# Patient Record
Sex: FEMALE | Race: WHITE | NOT HISPANIC OR LATINO | Employment: OTHER | URBAN - NONMETROPOLITAN AREA
[De-identification: names, ages, dates, MRNs, and addresses within clinical notes are randomized per-mention and may not be internally consistent; named-entity substitution may affect disease eponyms.]

---

## 2017-02-21 ENCOUNTER — OFFICE VISIT (OUTPATIENT)
Dept: PULMONOLOGY | Facility: CLINIC | Age: 57
End: 2017-02-21

## 2017-02-21 VITALS
DIASTOLIC BLOOD PRESSURE: 80 MMHG | WEIGHT: 120 LBS | SYSTOLIC BLOOD PRESSURE: 148 MMHG | BODY MASS INDEX: 22.08 KG/M2 | HEIGHT: 62 IN

## 2017-02-21 DIAGNOSIS — J44.9 CHRONIC OBSTRUCTIVE PULMONARY DISEASE, UNSPECIFIED COPD TYPE (HCC): Primary | ICD-10-CM

## 2017-02-21 DIAGNOSIS — Z72.0 TOBACCO USE: ICD-10-CM

## 2017-02-21 PROCEDURE — 99213 OFFICE O/P EST LOW 20 MIN: CPT | Performed by: INTERNAL MEDICINE

## 2017-02-21 RX ORDER — BUSPIRONE HYDROCHLORIDE 15 MG/1
15 TABLET ORAL
COMMUNITY
End: 2018-02-19

## 2017-02-21 NOTE — PROGRESS NOTES
This lady has COPD and persistent tobacco use.  She complains of mild wheezing and dyspnea however she is improved from the recent past.  She is not producing any purulent sputum    ROS    Constitutional-no night sweats weight loss headaches  GI no abdominal pain nausea or diarrhea  Neuro no seizure or neurologic deficits  Musculoskeletal no deformity or joint pain   no dysuria or hematuria  Skin no rash or other lesions  All other systems reviewed and were negative except for the above.      Physical Exam  Vital signs as above  Pupils equally round and reactive to light and accommodation, neck no JVD or adenopathy.  Cardiovascular regular rhythm and rate no murmur or gallop.  Abdomen soft no organomegaly tenderness.  Extremities no clubbing cyanosis or edema.  No cervical adenopathy.  No skin rash.  Neurologic good strength bilaterally without deficits mildly dyspneic white female, lungs are clear, O2 saturation 98%    Impression COPD with persistent dyspnea    Recommendations cessation of cigarette smoking, continue routine meds, return in 6 months

## 2017-06-16 ENCOUNTER — TELEPHONE (OUTPATIENT)
Dept: PULMONOLOGY | Facility: CLINIC | Age: 57
End: 2017-06-16

## 2017-06-16 NOTE — TELEPHONE ENCOUNTER
Prescirption refilled for 1 time on Spiriva inhaler.  Patient needs to make an appt for refills and recheck.

## 2017-06-16 NOTE — TELEPHONE ENCOUNTER
----- Message from Otilia Weston sent at 6/16/2017 10:57 AM CDT -----  Regarding: MED REFILL  Contact: [phone]  PATIENT IS WANTING A REFILL ON SYMBICORT AT [facility] [phone]

## 2017-08-22 ENCOUNTER — OFFICE VISIT (OUTPATIENT)
Dept: PULMONOLOGY | Facility: CLINIC | Age: 57
End: 2017-08-22

## 2017-08-22 VITALS
HEIGHT: 62 IN | BODY MASS INDEX: 22.16 KG/M2 | OXYGEN SATURATION: 92 % | DIASTOLIC BLOOD PRESSURE: 76 MMHG | SYSTOLIC BLOOD PRESSURE: 140 MMHG | WEIGHT: 120.4 LBS | HEART RATE: 51 BPM

## 2017-08-22 DIAGNOSIS — J43.1 PANLOBULAR EMPHYSEMA (HCC): Primary | ICD-10-CM

## 2017-08-22 PROCEDURE — 99213 OFFICE O/P EST LOW 20 MIN: CPT | Performed by: INTERNAL MEDICINE

## 2017-08-22 RX ORDER — DILTIAZEM HYDROCHLORIDE 60 MG/1
2 TABLET, FILM COATED ORAL 2 TIMES DAILY
Refills: 0 | COMMUNITY
Start: 2017-06-16 | End: 2018-12-27 | Stop reason: SDUPTHER

## 2017-08-22 RX ORDER — ALBUTEROL SULFATE 90 UG/1
AEROSOL, METERED RESPIRATORY (INHALATION)
Qty: 1 INHALER | Refills: 5 | Status: SHIPPED | OUTPATIENT
Start: 2017-08-22 | End: 2020-04-14 | Stop reason: SDUPTHER

## 2017-08-22 RX ORDER — HYDROXYZINE PAMOATE 50 MG/1
50 CAPSULE ORAL 2 TIMES DAILY
Refills: 3 | Status: ON HOLD | COMMUNITY
Start: 2017-07-03 | End: 2018-07-18

## 2017-08-22 RX ORDER — BUDESONIDE AND FORMOTEROL FUMARATE DIHYDRATE 160; 4.5 UG/1; UG/1
AEROSOL RESPIRATORY (INHALATION)
Qty: 1 INHALER | Refills: 5 | Status: SHIPPED | OUTPATIENT
Start: 2017-08-22 | End: 2020-02-10 | Stop reason: SDUPTHER

## 2017-08-22 NOTE — PROGRESS NOTES
"This lady has COPD and persistent tobacco use.  She continues to have cough wheeze shortness of breath and dyspnea on exertion.  She denies purulent sputum    ROS    Constitutional-no night sweats weight loss headaches  GI no abdominal pain nausea or diarrhea  Neuro no seizure or neurologic deficits  Musculoskeletal no deformity or joint pain   no dysuria or hematuria  Skin no rash or other lesions  All other systems reviewed and were negative except for the above.      Physical Exam  /76  Pulse 51  Ht 62\" (157.5 cm)  Wt 120 lb 6.4 oz (54.6 kg)  SpO2 92%  BMI 22.02 kg/m2  Vital signs as above  Pupils equally round and reactive to light and accommodation, neck no JVD or adenopathy.  Cardiovascular regular rhythm and rate no murmur or gallop.  Abdomen soft no organomegaly tenderness.  Extremities no clubbing cyanosis or edema.  No cervical adenopathy.  No skin rash.  Neurologic good strength bilaterally without deficits  Lungs reveal diminished breath sounds with rhonchi    Impression COPD and emphysema with persistent dyspnea, tobacco use    Recommendations cessation of cigarette smoking, continue present medications, return in 6 months  "

## 2017-12-01 ENCOUNTER — APPOINTMENT (OUTPATIENT)
Dept: GENERAL RADIOLOGY | Facility: HOSPITAL | Age: 57
End: 2017-12-01

## 2017-12-01 ENCOUNTER — HOSPITAL ENCOUNTER (EMERGENCY)
Facility: HOSPITAL | Age: 57
Discharge: HOME | End: 2017-12-02
Attending: EMERGENCY MEDICINE | Admitting: EMERGENCY MEDICINE

## 2017-12-01 DIAGNOSIS — J44.1 COPD EXACERBATION (HCC): ICD-10-CM

## 2017-12-01 DIAGNOSIS — J40 BRONCHITIS: Primary | ICD-10-CM

## 2017-12-01 LAB
ALBUMIN SERPL-MCNC: 3.6 G/DL (ref 3.4–4.8)
ALBUMIN/GLOB SERPL: 1.3 G/DL (ref 1.1–1.8)
ALP SERPL-CCNC: 67 U/L (ref 38–126)
ALT SERPL W P-5'-P-CCNC: 25 U/L (ref 9–52)
ANION GAP SERPL CALCULATED.3IONS-SCNC: 6 MMOL/L (ref 5–15)
AST SERPL-CCNC: 40 U/L (ref 14–36)
BASOPHILS # BLD AUTO: 0.01 10*3/MM3 (ref 0–0.2)
BASOPHILS NFR BLD AUTO: 0.1 % (ref 0–2)
BILIRUB SERPL-MCNC: 0.4 MG/DL (ref 0.2–1.3)
BUN BLD-MCNC: 10 MG/DL (ref 7–21)
BUN/CREAT SERPL: 13.3 (ref 7–25)
CALCIUM SPEC-SCNC: 9.1 MG/DL (ref 8.4–10.2)
CHLORIDE SERPL-SCNC: 104 MMOL/L (ref 95–110)
CO2 SERPL-SCNC: 28 MMOL/L (ref 22–31)
CREAT BLD-MCNC: 0.75 MG/DL (ref 0.5–1)
DEPRECATED RDW RBC AUTO: 46.1 FL (ref 36.4–46.3)
EOSINOPHIL # BLD AUTO: 0.03 10*3/MM3 (ref 0–0.7)
EOSINOPHIL NFR BLD AUTO: 0.3 % (ref 0–7)
ERYTHROCYTE [DISTWIDTH] IN BLOOD BY AUTOMATED COUNT: 13.9 % (ref 11.5–14.5)
GFR SERPL CREATININE-BSD FRML MDRD: 79 ML/MIN/1.73 (ref 60–120)
GLOBULIN UR ELPH-MCNC: 2.8 GM/DL (ref 2.3–3.5)
GLUCOSE BLD-MCNC: 110 MG/DL (ref 60–100)
HCT VFR BLD AUTO: 37.7 % (ref 35–45)
HGB BLD-MCNC: 12.7 G/DL (ref 12–15.5)
IMM GRANULOCYTES # BLD: 0.03 10*3/MM3 (ref 0–0.02)
IMM GRANULOCYTES NFR BLD: 0.3 % (ref 0–0.5)
INR PPP: 1.06 (ref 0.8–1.2)
LYMPHOCYTES # BLD AUTO: 2.6 10*3/MM3 (ref 0.6–4.2)
LYMPHOCYTES NFR BLD AUTO: 24.3 % (ref 10–50)
MCH RBC QN AUTO: 30.7 PG (ref 26.5–34)
MCHC RBC AUTO-ENTMCNC: 33.7 G/DL (ref 31.4–36)
MCV RBC AUTO: 91.1 FL (ref 80–98)
MONOCYTES # BLD AUTO: 1.33 10*3/MM3 (ref 0–0.9)
MONOCYTES NFR BLD AUTO: 12.5 % (ref 0–12)
NEUTROPHILS # BLD AUTO: 6.68 10*3/MM3 (ref 2–8.6)
NEUTROPHILS NFR BLD AUTO: 62.5 % (ref 37–80)
NT-PROBNP SERPL-MCNC: 279 PG/ML (ref 0–900)
PLATELET # BLD AUTO: 192 10*3/MM3 (ref 150–450)
PMV BLD AUTO: 9.8 FL (ref 8–12)
POTASSIUM BLD-SCNC: 3.4 MMOL/L (ref 3.5–5.1)
PROT SERPL-MCNC: 6.4 G/DL (ref 6.3–8.6)
PROTHROMBIN TIME: 13.7 SECONDS (ref 11.1–15.3)
RBC # BLD AUTO: 4.14 10*6/MM3 (ref 3.77–5.16)
SODIUM BLD-SCNC: 138 MMOL/L (ref 137–145)
TROPONIN I SERPL-MCNC: <0.012 NG/ML
WBC NRBC COR # BLD: 10.68 10*3/MM3 (ref 3.2–9.8)

## 2017-12-01 PROCEDURE — 71020 HC CHEST PA AND LATERAL: CPT

## 2017-12-01 PROCEDURE — 96375 TX/PRO/DX INJ NEW DRUG ADDON: CPT

## 2017-12-01 PROCEDURE — 99284 EMERGENCY DEPT VISIT MOD MDM: CPT

## 2017-12-01 PROCEDURE — 93010 ELECTROCARDIOGRAM REPORT: CPT | Performed by: INTERNAL MEDICINE

## 2017-12-01 PROCEDURE — 85025 COMPLETE CBC W/AUTO DIFF WBC: CPT | Performed by: EMERGENCY MEDICINE

## 2017-12-01 PROCEDURE — 83880 ASSAY OF NATRIURETIC PEPTIDE: CPT | Performed by: EMERGENCY MEDICINE

## 2017-12-01 PROCEDURE — 84484 ASSAY OF TROPONIN QUANT: CPT | Performed by: EMERGENCY MEDICINE

## 2017-12-01 PROCEDURE — 80053 COMPREHEN METABOLIC PANEL: CPT | Performed by: EMERGENCY MEDICINE

## 2017-12-01 PROCEDURE — 85610 PROTHROMBIN TIME: CPT | Performed by: EMERGENCY MEDICINE

## 2017-12-01 PROCEDURE — 96365 THER/PROPH/DIAG IV INF INIT: CPT

## 2017-12-01 PROCEDURE — 93005 ELECTROCARDIOGRAM TRACING: CPT | Performed by: EMERGENCY MEDICINE

## 2017-12-01 RX ORDER — SODIUM CHLORIDE 0.9 % (FLUSH) 0.9 %
10 SYRINGE (ML) INJECTION AS NEEDED
Status: DISCONTINUED | OUTPATIENT
Start: 2017-12-01 | End: 2017-12-02 | Stop reason: HOSPADM

## 2017-12-02 VITALS
DIASTOLIC BLOOD PRESSURE: 77 MMHG | BODY MASS INDEX: 22.08 KG/M2 | WEIGHT: 120 LBS | OXYGEN SATURATION: 94 % | TEMPERATURE: 98.7 F | HEIGHT: 62 IN | HEART RATE: 80 BPM | SYSTOLIC BLOOD PRESSURE: 164 MMHG | RESPIRATION RATE: 18 BRPM

## 2017-12-02 LAB
BILIRUB UR QL STRIP: NEGATIVE
CLARITY UR: CLEAR
COLOR UR: YELLOW
GLUCOSE UR STRIP-MCNC: NEGATIVE MG/DL
HGB UR QL STRIP.AUTO: NEGATIVE
HOLD SPECIMEN: NORMAL
HOLD SPECIMEN: NORMAL
KETONES UR QL STRIP: NEGATIVE
LEUKOCYTE ESTERASE UR QL STRIP.AUTO: NEGATIVE
NITRITE UR QL STRIP: NEGATIVE
PH UR STRIP.AUTO: 6 [PH] (ref 5–9)
PROT UR QL STRIP: NEGATIVE
SP GR UR STRIP: 1.02 (ref 1–1.03)
UROBILINOGEN UR QL STRIP: NORMAL
WHOLE BLOOD HOLD SPECIMEN: NORMAL
WHOLE BLOOD HOLD SPECIMEN: NORMAL

## 2017-12-02 PROCEDURE — 25010000002 CEFTRIAXONE: Performed by: EMERGENCY MEDICINE

## 2017-12-02 PROCEDURE — 25010000002 METHYLPREDNISOLONE PER 125 MG: Performed by: EMERGENCY MEDICINE

## 2017-12-02 PROCEDURE — 96375 TX/PRO/DX INJ NEW DRUG ADDON: CPT

## 2017-12-02 PROCEDURE — 96365 THER/PROPH/DIAG IV INF INIT: CPT

## 2017-12-02 PROCEDURE — 81003 URINALYSIS AUTO W/O SCOPE: CPT | Performed by: EMERGENCY MEDICINE

## 2017-12-02 RX ORDER — BENZONATATE 100 MG/1
100 CAPSULE ORAL 3 TIMES DAILY PRN
Qty: 20 CAPSULE | Refills: 0 | Status: SHIPPED | OUTPATIENT
Start: 2017-12-02 | End: 2018-02-19

## 2017-12-02 RX ORDER — PREDNISONE 50 MG/1
50 TABLET ORAL DAILY
Qty: 5 TABLET | Refills: 0 | Status: SHIPPED | OUTPATIENT
Start: 2017-12-02 | End: 2018-02-19

## 2017-12-02 RX ORDER — HYDROCODONE BITARTRATE AND ACETAMINOPHEN 5; 325 MG/1; MG/1
1 TABLET ORAL EVERY 6 HOURS PRN
Qty: 15 TABLET | Refills: 0 | Status: SHIPPED | OUTPATIENT
Start: 2017-12-02 | End: 2018-02-19

## 2017-12-02 RX ORDER — AZITHROMYCIN 250 MG/1
250 TABLET, FILM COATED ORAL DAILY
Qty: 6 TABLET | Refills: 0 | Status: SHIPPED | OUTPATIENT
Start: 2017-12-02 | End: 2018-02-19

## 2017-12-02 RX ORDER — METHYLPREDNISOLONE SODIUM SUCCINATE 125 MG/2ML
125 INJECTION, POWDER, LYOPHILIZED, FOR SOLUTION INTRAMUSCULAR; INTRAVENOUS ONCE
Status: COMPLETED | OUTPATIENT
Start: 2017-12-02 | End: 2017-12-02

## 2017-12-02 RX ORDER — HYDROCODONE BITARTRATE AND ACETAMINOPHEN 5; 325 MG/1; MG/1
1 TABLET ORAL ONCE
Status: COMPLETED | OUTPATIENT
Start: 2017-12-02 | End: 2017-12-02

## 2017-12-02 RX ADMIN — METHYLPREDNISOLONE SODIUM SUCCINATE 125 MG: 125 INJECTION, POWDER, FOR SOLUTION INTRAMUSCULAR; INTRAVENOUS at 01:33

## 2017-12-02 RX ADMIN — CEFTRIAXONE 1 G: 1 INJECTION, POWDER, FOR SOLUTION INTRAMUSCULAR; INTRAVENOUS at 00:59

## 2017-12-02 RX ADMIN — HYDROCODONE BITARTRATE AND ACETAMINOPHEN 1 TABLET: 5; 325 TABLET ORAL at 01:33

## 2017-12-02 NOTE — DISCHARGE INSTRUCTIONS
Follow-up with her doctor in about 3-5 days if symptoms persist for further evaluation and management.  Medications as prescribed.  Return with any new or worsening symptoms or any concerns.

## 2017-12-02 NOTE — ED PROVIDER NOTES
Subjective   HPI Comments: Patient presents with cough and increased sputum production and subjective fevers.  Patient also notes pain with cough in her ribs and back, as well as her abdominal wall.  This has known COPD.  Patient is on treatments at home including inhaler and nebulized treatments.  Patient sees Dr. Sang Marcos for her lungs.  Patient's primary care physician is Dr. Pam Campos.  Patient states she's been taking Robitussin Tylenol and Mucinex and the symptoms have been worsening patient concerned with possible flu with some exposures that she's had.  No history of atherosclerotic cardiac disease.  Patient is a nontobacco user.      History provided by:  Patient      Review of Systems   Constitutional: Negative.  Negative for appetite change, chills and fever.   HENT: Negative.  Negative for congestion.    Eyes: Negative.  Negative for photophobia and visual disturbance.   Respiratory: Positive for cough and shortness of breath. Negative for chest tightness.    Cardiovascular: Negative.  Negative for chest pain and palpitations.   Gastrointestinal: Negative.  Negative for abdominal pain, constipation, diarrhea, nausea and vomiting.   Endocrine: Negative.    Genitourinary: Negative.  Negative for decreased urine volume, dysuria, flank pain and hematuria.   Musculoskeletal: Negative.  Negative for arthralgias, back pain, myalgias, neck pain and neck stiffness.   Skin: Negative.  Negative for pallor.   Neurological: Negative.  Negative for dizziness, syncope, weakness, light-headedness, numbness and headaches.   Psychiatric/Behavioral: Negative.  Negative for confusion and suicidal ideas. The patient is not nervous/anxious.    All other systems reviewed and are negative.      Past Medical History:   Diagnosis Date   • COPD (chronic obstructive pulmonary disease)    • Emphysema of lung    • SOB (shortness of breath)    • Tobacco use    • Vaginal bleeding        Allergies   Allergen Reactions   •  Amitriptyline    • Codeine    • Tricyclic Antidepressants    • Tylenol [Acetaminophen]        Past Surgical History:   Procedure Laterality Date   • HYSTERECTOMY      Ovary Preserv, age 16       Family History   Problem Relation Age of Onset   • Family history unknown: Yes       Social History     Social History   • Marital status: Single     Spouse name: N/A   • Number of children: N/A   • Years of education: N/A     Social History Main Topics   • Smoking status: Current Every Day Smoker   • Smokeless tobacco: Current User   • Alcohol use No   • Drug use: No   • Sexual activity: Not Asked     Other Topics Concern   • None     Social History Narrative           Objective   Physical Exam   Constitutional: She is oriented to person, place, and time. She appears well-developed and well-nourished. No distress.   HENT:   Head: Normocephalic and atraumatic.   Nose: Nose normal.   Mouth/Throat: Oropharynx is clear and moist.   Eyes: Conjunctivae and EOM are normal. No scleral icterus.   Neck: Normal range of motion. Neck supple. No JVD present.   Cardiovascular: Normal rate, regular rhythm, normal heart sounds and intact distal pulses.  Exam reveals no gallop and no friction rub.    No murmur heard.  Pulmonary/Chest: Effort normal. No respiratory distress. She has no wheezes. She has no rales. She exhibits no tenderness.   Patient is moving good air no increased respiratory effort.  Patient is coughing often with increased sputum production.  Sputum currently is yellowish in color.   Abdominal: Soft. She exhibits no distension and no mass. There is no tenderness. There is no rebound and no guarding.   Musculoskeletal: Normal range of motion. She exhibits no edema, tenderness or deformity.   Lymphadenopathy:     She has no cervical adenopathy.   Neurological: She is alert and oriented to person, place, and time. No cranial nerve deficit. She exhibits normal muscle tone.   Skin: Skin is warm and dry. No rash noted. She is  not diaphoretic. No erythema. No pallor.   Psychiatric: She has a normal mood and affect. Her behavior is normal. Judgment and thought content normal.   Nursing note and vitals reviewed.      ECG 12 Lead    Date/Time: 12/1/2017 10:59 PM  Performed by: EMILY CASSIDY  Authorized by: EMILY CASSIDY   Rhythm: sinus rhythm and paced  Clinical impression: non-specific ECG               ED Course  ED Course      Labs Reviewed   COMPREHENSIVE METABOLIC PANEL - Abnormal; Notable for the following:        Result Value    Glucose 110 (*)     Potassium 3.4 (*)     AST (SGOT) 40 (*)     All other components within normal limits   CBC WITH AUTO DIFFERENTIAL - Abnormal; Notable for the following:     WBC 10.68 (*)     Monocyte % 12.5 (*)     Monocytes, Absolute 1.33 (*)     Immature Grans, Absolute 0.03 (*)     All other components within normal limits   TROPONIN (IN-HOUSE) - Normal   BNP (IN-HOUSE) - Normal   PROTIME-INR - Normal    Narrative:     Therapeutic range for most indications is 2.0-3.0 INR,  or 2.5-3.5 for mechanical heart valves.   URINALYSIS W/ CULTURE IF INDICATED - Normal    Narrative:     Urine microscopic not indicated.   RAINBOW DRAW    Narrative:     The following orders were created for panel order [place] Draw.  Procedure                               Abnormality         Status                     ---------                               -----------         ------                     Light Blue Top[[id number]]                                   Final result               Green Top (Gel)[[id number]]                                  Final result               Lavender Top[[id number]]                                     Final result               Gold Top - SST[[id number]]                                   Final result                 Please view results for these tests on the individual orders.   TROPONIN (IN-HOUSE)   CBC AND DIFFERENTIAL    Narrative:     The following orders were created for panel order CBC &  Differential.  Procedure                               Abnormality         Status                     ---------                               -----------         ------                     CBC Auto Differential[[id number]]        Abnormal            Final result                 Please view results for these tests on the individual orders.   LIGHT BLUE TOP   GREEN TOP   LAVENDER TOP   GOLD TOP - SST       XR Chest 2 View   Final Result   No acute cardiopulmonary abnormality.      Electronically signed by:  Will Swain MD  12/1/2017 11:21 PM   [facility] Workstation: [id number]        Patient with no significant leukocytosis or x-ray findings.  Signs and symptoms consistent with bronchitis/COPD exacerbation.  Patient started on steroids antibiotics and cough suppressant.  Patient follow-up with her outpatient provider for further management.  Patient sees to saturate well in the mid 90s off of oxygen.  No increased work of breathing.  Though some discomfort with cough.            [facility]    Final diagnoses:   Bronchitis   COPD exacerbation            Manuel Seymour MD  12/02/17 0143

## 2017-12-06 DIAGNOSIS — R06.02 SHORTNESS OF BREATH: ICD-10-CM

## 2017-12-06 RX ORDER — IPRATROPIUM BROMIDE AND ALBUTEROL SULFATE 2.5; .5 MG/3ML; MG/3ML
SOLUTION RESPIRATORY (INHALATION)
Qty: 360 ML | Refills: 11 | Status: SHIPPED | OUTPATIENT
Start: 2017-12-06 | End: 2019-01-30 | Stop reason: SDUPTHER

## 2018-02-01 ENCOUNTER — OFFICE VISIT (OUTPATIENT)
Dept: PULMONOLOGY | Facility: CLINIC | Age: 58
End: 2018-02-01

## 2018-02-01 VITALS
WEIGHT: 120 LBS | DIASTOLIC BLOOD PRESSURE: 74 MMHG | HEIGHT: 62 IN | BODY MASS INDEX: 22.08 KG/M2 | HEART RATE: 63 BPM | SYSTOLIC BLOOD PRESSURE: 132 MMHG | OXYGEN SATURATION: 84 %

## 2018-02-01 DIAGNOSIS — J41.8 MIXED SIMPLE AND MUCOPURULENT CHRONIC BRONCHITIS (HCC): ICD-10-CM

## 2018-02-01 DIAGNOSIS — J43.1 PANLOBULAR EMPHYSEMA (HCC): Primary | ICD-10-CM

## 2018-02-01 PROCEDURE — 99213 OFFICE O/P EST LOW 20 MIN: CPT | Performed by: INTERNAL MEDICINE

## 2018-02-01 RX ORDER — RAMIPRIL 1.25 MG/1
1.25 CAPSULE ORAL
COMMUNITY
Start: 2018-01-11 | End: 2018-07-20 | Stop reason: HOSPADM

## 2018-02-01 RX ORDER — ROSUVASTATIN CALCIUM 10 MG/1
10 TABLET, COATED ORAL NIGHTLY
Status: ON HOLD | COMMUNITY
End: 2018-07-17

## 2018-02-01 RX ORDER — CLOPIDOGREL BISULFATE 75 MG/1
75 TABLET ORAL DAILY
COMMUNITY
Start: 2018-01-11

## 2018-02-01 NOTE — PROGRESS NOTES
"This 57-year-old lady with emphysema recently quit smoking cigarettes but continues to have shortness of breath and dyspnea on minimal exertion despite taking bronchodilators.  She has not coughing or producing purulent sputum.    ROS    Constitutional-no night sweats weight loss headaches  GI no abdominal pain nausea or diarrhea  Neuro no seizure or neurologic deficits  Musculoskeletal no deformity or joint pain   no dysuria or hematuria  Skin no rash or other lesions  All other systems reviewed and were negative except for the above.      Physical Exam  /74 (BP Location: Left arm, Patient Position: Sitting, Cuff Size: Adult)  Pulse 63  Ht 157.5 cm (62\")  Wt 54.4 kg (120 lb)  SpO2 (!) 84%  BMI 21.95 kg/m2  Vital signs as above  Pupils equally round and reactive to light and accommodation, neck no JVD or adenopathy.  Cardiovascular regular rhythm and rate no murmur or gallop.  Abdomen soft no organomegaly tenderness.  Extremities no clubbing cyanosis or edema.  No cervical adenopathy.  No skin rash.  Neurologic good strength bilaterally without deficits  Lungs reveal diminished breath sounds with prolonged expiration without wheeze    Last chest x-ray showed emphysema, O2 sat 94 at rest on room air however minimal walking on room air O2 saturation dropped to 84%    Impression edema with hypoxemia    Recommendations O2 2 L nasal the a concentrator, continue present medications, return in 3 months          This document has been electronically signed by Sang Marcos MD on February 1, 2018 1:53 PM      "

## 2018-02-05 ENCOUNTER — TELEPHONE (OUTPATIENT)
Dept: PULMONOLOGY | Facility: CLINIC | Age: 58
End: 2018-02-05

## 2018-02-19 ENCOUNTER — HOSPITAL ENCOUNTER (OUTPATIENT)
Facility: HOSPITAL | Age: 58
Setting detail: OBSERVATION
Discharge: HOME | End: 2018-02-20
Attending: EMERGENCY MEDICINE | Admitting: INTERNAL MEDICINE

## 2018-02-19 ENCOUNTER — APPOINTMENT (OUTPATIENT)
Dept: CT IMAGING | Facility: HOSPITAL | Age: 58
End: 2018-02-19

## 2018-02-19 ENCOUNTER — APPOINTMENT (OUTPATIENT)
Dept: GENERAL RADIOLOGY | Facility: HOSPITAL | Age: 58
End: 2018-02-19

## 2018-02-19 DIAGNOSIS — R06.00 DYSPNEA, UNSPECIFIED TYPE: ICD-10-CM

## 2018-02-19 DIAGNOSIS — R07.9 CHEST PAIN, UNSPECIFIED TYPE: Primary | ICD-10-CM

## 2018-02-19 DIAGNOSIS — J44.9 COPD WITH HYPOXIA (HCC): ICD-10-CM

## 2018-02-19 DIAGNOSIS — R09.02 COPD WITH HYPOXIA (HCC): ICD-10-CM

## 2018-02-19 LAB
ALBUMIN SERPL-MCNC: 3.9 G/DL (ref 3.4–4.8)
ALBUMIN/GLOB SERPL: 1.3 G/DL (ref 1.1–1.8)
ALP SERPL-CCNC: 66 U/L (ref 38–126)
ALT SERPL W P-5'-P-CCNC: 24 U/L (ref 9–52)
ANION GAP SERPL CALCULATED.3IONS-SCNC: 10 MMOL/L (ref 5–15)
AST SERPL-CCNC: 31 U/L (ref 14–36)
BASOPHILS # BLD AUTO: 0.01 10*3/MM3 (ref 0–0.2)
BASOPHILS NFR BLD AUTO: 0.1 % (ref 0–2)
BILIRUB SERPL-MCNC: 0.5 MG/DL (ref 0.2–1.3)
BILIRUB UR QL STRIP: NEGATIVE
BUN BLD-MCNC: 19 MG/DL (ref 7–21)
BUN/CREAT SERPL: 25.7 (ref 7–25)
CALCIUM SPEC-SCNC: 9.7 MG/DL (ref 8.4–10.2)
CHLORIDE SERPL-SCNC: 101 MMOL/L (ref 95–110)
CLARITY UR: CLEAR
CO2 SERPL-SCNC: 33 MMOL/L (ref 22–31)
COLOR UR: YELLOW
CREAT BLD-MCNC: 0.74 MG/DL (ref 0.5–1)
D-DIMER, QUANTITATIVE (MAD,POW, STR): 833 NG/ML (FEU) (ref 0–470)
DEPRECATED RDW RBC AUTO: 49 FL (ref 36.4–46.3)
EOSINOPHIL # BLD AUTO: 0.1 10*3/MM3 (ref 0–0.7)
EOSINOPHIL NFR BLD AUTO: 1.3 % (ref 0–7)
ERYTHROCYTE [DISTWIDTH] IN BLOOD BY AUTOMATED COUNT: 14.2 % (ref 11.5–14.5)
GFR SERPL CREATININE-BSD FRML MDRD: 80 ML/MIN/1.73 (ref 60–120)
GLOBULIN UR ELPH-MCNC: 3.1 GM/DL (ref 2.3–3.5)
GLUCOSE BLD-MCNC: 89 MG/DL (ref 60–100)
GLUCOSE UR STRIP-MCNC: NEGATIVE MG/DL
HCT VFR BLD AUTO: 36.9 % (ref 35–45)
HGB BLD-MCNC: 12.1 G/DL (ref 12–15.5)
HGB UR QL STRIP.AUTO: NEGATIVE
HOLD SPECIMEN: NORMAL
HOLD SPECIMEN: NORMAL
IMM GRANULOCYTES # BLD: 0.01 10*3/MM3 (ref 0–0.02)
IMM GRANULOCYTES NFR BLD: 0.1 % (ref 0–0.5)
INR PPP: 0.95 (ref 0.8–1.2)
KETONES UR QL STRIP: NEGATIVE
LEUKOCYTE ESTERASE UR QL STRIP.AUTO: NEGATIVE
LIPASE SERPL-CCNC: 106 U/L (ref 23–300)
LYMPHOCYTES # BLD AUTO: 2.02 10*3/MM3 (ref 0.6–4.2)
LYMPHOCYTES NFR BLD AUTO: 27 % (ref 10–50)
MCH RBC QN AUTO: 30.9 PG (ref 26.5–34)
MCHC RBC AUTO-ENTMCNC: 32.8 G/DL (ref 31.4–36)
MCV RBC AUTO: 94.1 FL (ref 80–98)
MONOCYTES # BLD AUTO: 0.78 10*3/MM3 (ref 0–0.9)
MONOCYTES NFR BLD AUTO: 10.4 % (ref 0–12)
NEUTROPHILS # BLD AUTO: 4.57 10*3/MM3 (ref 2–8.6)
NEUTROPHILS NFR BLD AUTO: 61.1 % (ref 37–80)
NITRITE UR QL STRIP: NEGATIVE
NT-PROBNP SERPL-MCNC: 967 PG/ML (ref 0–900)
PH UR STRIP.AUTO: 5.5 [PH] (ref 5–9)
PLATELET # BLD AUTO: 226 10*3/MM3 (ref 150–450)
PMV BLD AUTO: 10.2 FL (ref 8–12)
POTASSIUM BLD-SCNC: 3.9 MMOL/L (ref 3.5–5.1)
PROT SERPL-MCNC: 7 G/DL (ref 6.3–8.6)
PROT UR QL STRIP: NEGATIVE
PROTHROMBIN TIME: 12.6 SECONDS (ref 11.1–15.3)
RBC # BLD AUTO: 3.92 10*6/MM3 (ref 3.77–5.16)
SODIUM BLD-SCNC: 144 MMOL/L (ref 137–145)
SP GR UR STRIP: 1.02 (ref 1–1.03)
TROPONIN I SERPL-MCNC: <0.012 NG/ML
UROBILINOGEN UR QL STRIP: NORMAL
WBC NRBC COR # BLD: 7.49 10*3/MM3 (ref 3.2–9.8)
WHOLE BLOOD HOLD SPECIMEN: NORMAL

## 2018-02-19 PROCEDURE — 93010 ELECTROCARDIOGRAM REPORT: CPT | Performed by: INTERNAL MEDICINE

## 2018-02-19 PROCEDURE — G0378 HOSPITAL OBSERVATION PER HR: HCPCS

## 2018-02-19 PROCEDURE — 85610 PROTHROMBIN TIME: CPT | Performed by: EMERGENCY MEDICINE

## 2018-02-19 PROCEDURE — 71275 CT ANGIOGRAPHY CHEST: CPT

## 2018-02-19 PROCEDURE — 99285 EMERGENCY DEPT VISIT HI MDM: CPT

## 2018-02-19 PROCEDURE — 80053 COMPREHEN METABOLIC PANEL: CPT | Performed by: EMERGENCY MEDICINE

## 2018-02-19 PROCEDURE — 94640 AIRWAY INHALATION TREATMENT: CPT

## 2018-02-19 PROCEDURE — 0 IOPAMIDOL PER 1 ML: Performed by: INTERNAL MEDICINE

## 2018-02-19 PROCEDURE — 93005 ELECTROCARDIOGRAM TRACING: CPT | Performed by: EMERGENCY MEDICINE

## 2018-02-19 PROCEDURE — 81003 URINALYSIS AUTO W/O SCOPE: CPT | Performed by: EMERGENCY MEDICINE

## 2018-02-19 PROCEDURE — 84484 ASSAY OF TROPONIN QUANT: CPT | Performed by: EMERGENCY MEDICINE

## 2018-02-19 PROCEDURE — 25010000002 METHYLPREDNISOLONE PER 125 MG: Performed by: NURSE PRACTITIONER

## 2018-02-19 PROCEDURE — 96374 THER/PROPH/DIAG INJ IV PUSH: CPT

## 2018-02-19 PROCEDURE — 85379 FIBRIN DEGRADATION QUANT: CPT | Performed by: NURSE PRACTITIONER

## 2018-02-19 PROCEDURE — 84484 ASSAY OF TROPONIN QUANT: CPT | Performed by: NURSE PRACTITIONER

## 2018-02-19 PROCEDURE — 83690 ASSAY OF LIPASE: CPT | Performed by: EMERGENCY MEDICINE

## 2018-02-19 PROCEDURE — 85025 COMPLETE CBC W/AUTO DIFF WBC: CPT | Performed by: EMERGENCY MEDICINE

## 2018-02-19 PROCEDURE — 71045 X-RAY EXAM CHEST 1 VIEW: CPT

## 2018-02-19 PROCEDURE — 83880 ASSAY OF NATRIURETIC PEPTIDE: CPT | Performed by: EMERGENCY MEDICINE

## 2018-02-19 RX ORDER — RAMIPRIL 1.25 MG/1
1.25 CAPSULE ORAL
Status: DISCONTINUED | OUTPATIENT
Start: 2018-02-19 | End: 2018-02-20 | Stop reason: HOSPADM

## 2018-02-19 RX ORDER — ATENOLOL 25 MG/1
25 TABLET ORAL DAILY
Status: DISCONTINUED | OUTPATIENT
Start: 2018-02-20 | End: 2018-02-20 | Stop reason: HOSPADM

## 2018-02-19 RX ORDER — PANTOPRAZOLE SODIUM 40 MG/1
40 TABLET, DELAYED RELEASE ORAL
Status: DISCONTINUED | OUTPATIENT
Start: 2018-02-20 | End: 2018-02-20 | Stop reason: HOSPADM

## 2018-02-19 RX ORDER — ACETAMINOPHEN 500 MG
1000 TABLET ORAL ONCE
Status: COMPLETED | OUTPATIENT
Start: 2018-02-19 | End: 2018-02-19

## 2018-02-19 RX ORDER — ATENOLOL 25 MG/1
25 TABLET ORAL DAILY
COMMUNITY

## 2018-02-19 RX ORDER — ROSUVASTATIN CALCIUM 10 MG/1
10 TABLET, COATED ORAL NIGHTLY
Status: DISCONTINUED | OUTPATIENT
Start: 2018-02-19 | End: 2018-02-20 | Stop reason: HOSPADM

## 2018-02-19 RX ORDER — HYDROXYZINE PAMOATE 25 MG/1
50 CAPSULE ORAL 2 TIMES DAILY
Status: DISCONTINUED | OUTPATIENT
Start: 2018-02-19 | End: 2018-02-20 | Stop reason: HOSPADM

## 2018-02-19 RX ORDER — ONDANSETRON 2 MG/ML
4 INJECTION INTRAMUSCULAR; INTRAVENOUS EVERY 6 HOURS PRN
Status: DISCONTINUED | OUTPATIENT
Start: 2018-02-19 | End: 2018-02-20 | Stop reason: HOSPADM

## 2018-02-19 RX ORDER — ASPIRIN 81 MG/1
81 TABLET ORAL DAILY
COMMUNITY

## 2018-02-19 RX ORDER — SODIUM CHLORIDE 0.9 % (FLUSH) 0.9 %
10 SYRINGE (ML) INJECTION AS NEEDED
Status: DISCONTINUED | OUTPATIENT
Start: 2018-02-19 | End: 2018-02-20 | Stop reason: HOSPADM

## 2018-02-19 RX ORDER — ESCITALOPRAM OXALATE 10 MG/1
10 TABLET ORAL EVERY MORNING
Status: DISCONTINUED | OUTPATIENT
Start: 2018-02-20 | End: 2018-02-20 | Stop reason: HOSPADM

## 2018-02-19 RX ORDER — BUDESONIDE AND FORMOTEROL FUMARATE DIHYDRATE 80; 4.5 UG/1; UG/1
2 AEROSOL RESPIRATORY (INHALATION) 2 TIMES DAILY
Status: DISCONTINUED | OUTPATIENT
Start: 2018-02-19 | End: 2018-02-20 | Stop reason: HOSPADM

## 2018-02-19 RX ORDER — CLOPIDOGREL BISULFATE 75 MG/1
75 TABLET ORAL DAILY
Status: DISCONTINUED | OUTPATIENT
Start: 2018-02-19 | End: 2018-02-20 | Stop reason: HOSPADM

## 2018-02-19 RX ORDER — ESCITALOPRAM OXALATE 10 MG/1
10 TABLET ORAL EVERY MORNING
COMMUNITY

## 2018-02-19 RX ORDER — METHYLPREDNISOLONE SODIUM SUCCINATE 125 MG/2ML
60 INJECTION, POWDER, LYOPHILIZED, FOR SOLUTION INTRAMUSCULAR; INTRAVENOUS EVERY 6 HOURS
Status: DISCONTINUED | OUTPATIENT
Start: 2018-02-19 | End: 2018-02-20 | Stop reason: HOSPADM

## 2018-02-19 RX ORDER — BUDESONIDE AND FORMOTEROL FUMARATE DIHYDRATE 160; 4.5 UG/1; UG/1
2 AEROSOL RESPIRATORY (INHALATION)
Status: DISCONTINUED | OUTPATIENT
Start: 2018-02-19 | End: 2018-02-19 | Stop reason: SDUPTHER

## 2018-02-19 RX ORDER — ASPIRIN 81 MG/1
81 TABLET ORAL DAILY
Status: DISCONTINUED | OUTPATIENT
Start: 2018-02-20 | End: 2018-02-20 | Stop reason: HOSPADM

## 2018-02-19 RX ORDER — ALBUTEROL SULFATE 90 UG/1
2 AEROSOL, METERED RESPIRATORY (INHALATION)
Status: DISCONTINUED | OUTPATIENT
Start: 2018-02-19 | End: 2018-02-19 | Stop reason: CLARIF

## 2018-02-19 RX ORDER — NITROGLYCERIN 0.4 MG/1
0.4 TABLET SUBLINGUAL
COMMUNITY

## 2018-02-19 RX ORDER — CYCLOSPORINE 0.5 MG/ML
1 EMULSION OPHTHALMIC 2 TIMES DAILY
COMMUNITY

## 2018-02-19 RX ORDER — ASPIRIN 325 MG
325 TABLET ORAL ONCE
Status: COMPLETED | OUTPATIENT
Start: 2018-02-19 | End: 2018-02-19

## 2018-02-19 RX ORDER — SODIUM CHLORIDE 0.9 % (FLUSH) 0.9 %
1-10 SYRINGE (ML) INJECTION AS NEEDED
Status: DISCONTINUED | OUTPATIENT
Start: 2018-02-19 | End: 2018-02-20 | Stop reason: HOSPADM

## 2018-02-19 RX ORDER — IPRATROPIUM BROMIDE AND ALBUTEROL SULFATE 2.5; .5 MG/3ML; MG/3ML
3 SOLUTION RESPIRATORY (INHALATION)
Status: DISCONTINUED | OUTPATIENT
Start: 2018-02-19 | End: 2018-02-20 | Stop reason: HOSPADM

## 2018-02-19 RX ORDER — MORPHINE SULFATE 2 MG/ML
1 INJECTION, SOLUTION INTRAMUSCULAR; INTRAVENOUS EVERY 4 HOURS PRN
Status: DISCONTINUED | OUTPATIENT
Start: 2018-02-19 | End: 2018-02-20 | Stop reason: HOSPADM

## 2018-02-19 RX ORDER — ACETAMINOPHEN 325 MG/1
650 TABLET ORAL EVERY 4 HOURS PRN
Status: DISCONTINUED | OUTPATIENT
Start: 2018-02-19 | End: 2018-02-20 | Stop reason: HOSPADM

## 2018-02-19 RX ADMIN — IPRATROPIUM BROMIDE AND ALBUTEROL SULFATE 3 ML: 2.5; .5 SOLUTION RESPIRATORY (INHALATION) at 19:28

## 2018-02-19 RX ADMIN — ROSUVASTATIN CALCIUM 10 MG: 10 TABLET, FILM COATED ORAL at 20:29

## 2018-02-19 RX ADMIN — HYDROXYZINE PAMOATE 50 MG: 25 CAPSULE ORAL at 20:29

## 2018-02-19 RX ADMIN — IOPAMIDOL 59 ML: 755 INJECTION, SOLUTION INTRAVENOUS at 19:55

## 2018-02-19 RX ADMIN — BUDESONIDE AND FORMOTEROL FUMARATE DIHYDRATE 2 PUFF: 80; 4.5 AEROSOL RESPIRATORY (INHALATION) at 19:28

## 2018-02-19 RX ADMIN — ACETAMINOPHEN 1000 MG: 500 TABLET ORAL at 15:19

## 2018-02-19 RX ADMIN — METHYLPREDNISOLONE SODIUM SUCCINATE 60 MG: 125 INJECTION, POWDER, FOR SOLUTION INTRAMUSCULAR; INTRAVENOUS at 17:48

## 2018-02-19 RX ADMIN — CLOPIDOGREL BISULFATE 75 MG: 75 TABLET ORAL at 17:48

## 2018-02-19 RX ADMIN — ASPIRIN 325 MG: 325 TABLET, COATED ORAL at 09:29

## 2018-02-19 RX ADMIN — RAMIPRIL 1.25 MG: 1.25 CAPSULE ORAL at 17:48

## 2018-02-19 NOTE — H&P
[facility] Medicine Admission      Date of Admission: 2/19/2018      Primary Care Physician: BERNARDA Quijano      Chief Complaint: Chest pain/ shortness of breath    HPI:  This is a 57 year old lady that presents to [facility] with complaints of chest pain and shortness of air.  The patient has a history of emphysema and follows with Dr. Sang Marcos.  Per his office note, the patient continues to have shortness of breath and dyspnea with minimal exertion despite taking bronchodilators.  The patient follows with cardiologist Dr. Carpio in [place].  The patient had a heart cath on 1/10/2018 that showed non-obstructive CAD and a normal left ventricular systolic function.       Concurrent Medical History:  has a past medical history of COPD (chronic obstructive pulmonary disease); Emphysema of lung; SOB (shortness of breath); Tobacco use; and Vaginal bleeding.    Past Surgical History:  has a past surgical history that includes Hysterectomy.    Family History: Family history is unknown by patient.    Social History:  reports that she has been smoking.  She uses smokeless tobacco. She reports that she does not drink alcohol or use illicit drugs.    Allergies:   Allergies   Allergen Reactions   • Amitriptyline    • Codeine    • Nortriptyline    • Tricyclic Antidepressants    • Tylenol [Acetaminophen] Nausea Only       Medications:   Prior to Admission medications    Medication Sig Start Date End Date Taking? Authorizing Provider   aspirin 81 MG EC tablet Take 81 mg by mouth Daily.   Yes Historical Provider, MD   atenolol (TENORMIN) 25 MG tablet Take 25 mg by mouth Daily.   Yes Historical Provider, MD   budesonide-formoterol (SYMBICORT) 160-4.5 MCG/ACT inhaler 2 puffs twice a day 8/22/17  Yes Sang Marcos MD   clopidogrel (PLAVIX) 75 MG tablet Take 75 mg by mouth Daily. 1/11/18  Yes Historical Provider, MD   escitalopram (LEXAPRO) 10 MG tablet Take 10 mg by mouth Every  Morning.   Yes Historical Provider, MD   hydrOXYzine (VISTARIL) 50 MG capsule Take 50 mg by mouth 2 (Two) Times a Day. 7/3/17  Yes Historical Provider, MD   ipratropium-albuterol (DUO-NEB) 0.5-2.5 mg/mL nebulizer USE 1 VIAL IN NEBULIZER FOUR TIMES DAILY 12/6/17  Yes Sang Marcos MD   Omega-3 Fatty Acids (OMEGA 3 PO) Take  by mouth.   Yes Historical Provider, MD   OMEPRAZOLE PO Take 20 mg by mouth Daily As Needed (stomach acid).   Yes Historical Provider, MD   Pediatric Multiple Vitamins (THERA MULTI-VITAMIN PO) Take 1 tablet by mouth Daily.   Yes Historical Provider, MD   ramipril (ALTACE) 1.25 MG capsule Take 1.25 mg by mouth. 1/11/18  Yes Historical Provider, MD   rosuvastatin (CRESTOR) 10 MG tablet Take 10 mg by mouth Every Night.   Yes Historical Provider, MD   SYMBICORT 80-4.5 MCG/ACT inhaler Inhale 2 puffs 2 (Two) Times a Day. 6/16/17  Yes Historical Provider, MD   ATENOLOL PO Take 25 mg by mouth Daily.  2/19/18 Yes Historical Provider, MD   albuterol (VENTOLIN HFA) 108 (90 Base) MCG/ACT inhaler 2 puffs every 4 hours as needed for breathing 8/22/17   Sang Marcos MD   cycloSPORINE (RESTASIS) 0.05 % ophthalmic emulsion Administer 1 drop to both eyes 2 (Two) Times a Day.    Historical Provider, MD   nitroglycerin (NITROSTAT) 0.4 MG SL tablet Place 0.4 mg under the tongue Every 5 (Five) Minutes As Needed for Chest Pain. Take no more than 3 doses in 15 minutes.    Historical Provider, MD   ALBUTEROL SULFATE ER PO Take  by mouth.  2/19/18  Historical Provider, MD   azithromycin (ZITHROMAX) 250 MG tablet Take 1 tablet by mouth Daily. Take 2 tablets the first day, then 1 tablet daily for 4 days. 12/2/17 2/19/18  Manuel Seymour MD   benzonatate (TESSALON) 100 MG capsule Take 1 capsule by mouth 3 (Three) Times a Day As Needed for Cough. 12/2/17 2/19/18  Manuel Seymour MD   BUDESONIDE IN Inhale.  2/19/18  Historical Provider, MD   busPIRone (BUSPAR) 15 MG tablet Take 15 mg by mouth.  2/19/18  Historical  Provider, MD   CITALOPRAM HYDROBROMIDE PO Take  by mouth.  2/19/18  Historical Provider, MD   ERGOCALCIFEROL PO Take  by mouth.  2/19/18  Historical Provider, MD   estradiol (EVAMIST) 1.53 MG/SPRAY transdermal spray Place 1 spray on the skin daily.  2/19/18  Historical Provider, MD   HYDROcodone-acetaminophen (NORCO) 5-325 MG per tablet Take 1 tablet by mouth Every 6 (Six) Hours As Needed (cough, pain with cough). 12/2/17 2/19/18  Manuel Seymour MD   MAGNESIUM OXIDE PO Take  by mouth.  2/19/18  Historical Provider, MD   Nebulizer misc   2/19/18  Historical Provider, MD   NITROGLYCERIN ER PO Take  by mouth.  2/19/18  Historical Provider, MD   predniSONE (DELTASONE) 50 MG tablet Take 1 tablet by mouth Daily. 12/2/17 2/19/18  Manuel Seymour MD   ROSUVASTATIN CALCIUM PO Take  by mouth.  2/19/18  Historical Provider, MD   VITAMIN E PO Take  by mouth.  2/19/18  Historical Provider, MD       Review of Systems:  Review of Systems   Respiratory: Positive for shortness of breath.    Cardiovascular: Positive for chest pain.      Otherwise complete ROS is negative except as mentioned above.    Physical Exam:   Temp:  [97.7 °F (36.5 °C)] 97.7 °F (36.5 °C)  Heart Rate:  [58-61] 60  Resp:  [18-20] 20  BP: (132-157)/(60-69) 133/60  Physical Exam   Constitutional: She is oriented to person, place, and time. She appears well-developed and well-nourished.   HENT:   Head: Normocephalic and atraumatic.   Eyes: EOM are normal. Pupils are equal, round, and reactive to light.   Neck: Normal range of motion. Neck supple.   Cardiovascular: Normal rate and regular rhythm.    Pulmonary/Chest: Effort normal and breath sounds normal.   Abdominal: Soft. Bowel sounds are normal.   Musculoskeletal: Normal range of motion.   Neurological: She is alert and oriented to person, place, and time.   Skin: Skin is warm.   Psychiatric: She has a normal mood and affect.         Results Reviewed:  I have personally reviewed current lab, radiology, and  data and agree with results.  Lab Results (last 24 hours)     Procedure Component Value Units Date/Time    CBC & Differential [[id number]] Collected:  02/19/18 0930    Specimen:  Blood Updated:  02/19/18 0943    Narrative:       The following orders were created for panel order CBC & Differential.  Procedure                               Abnormality         Status                     ---------                               -----------         ------                     CBC Auto Differential[[id number]]        Abnormal            Final result                 Please view results for these tests on the individual orders.    CBC Auto Differential [[id number]]  (Abnormal) Collected:  02/19/18 0930    Specimen:  Blood Updated:  02/19/18 0943     WBC 7.49 10*3/mm3      RBC 3.92 10*6/mm3      Hemoglobin 12.1 g/dL      Hematocrit 36.9 %      MCV 94.1 fL      MCH 30.9 pg      MCHC 32.8 g/dL      RDW 14.2 %      RDW-SD 49.0 (H) fl      MPV 10.2 fL      Platelets 226 10*3/mm3      Neutrophil % 61.1 %      Lymphocyte % 27.0 %      Monocyte % 10.4 %      Eosinophil % 1.3 %      Basophil % 0.1 %      Immature Grans % 0.1 %      Neutrophils, Absolute 4.57 10*3/mm3      Lymphocytes, Absolute 2.02 10*3/mm3      Monocytes, Absolute 0.78 10*3/mm3      Eosinophils, Absolute 0.10 10*3/mm3      Basophils, Absolute 0.01 10*3/mm3      Immature Grans, Absolute 0.01 10*3/mm3     Comprehensive Metabolic Panel [[id number]]  (Abnormal) Collected:  02/19/18 0930    Specimen:  Blood Updated:  02/19/18 0959     Glucose 89 mg/dL      BUN 19 mg/dL      Creatinine 0.74 mg/dL      Sodium 144 mmol/L      Potassium 3.9 mmol/L      Chloride 101 mmol/L      CO2 33.0 (H) mmol/L      Calcium 9.7 mg/dL      Total Protein 7.0 g/dL      Albumin 3.90 g/dL      ALT (SGPT) 24 U/L      AST (SGOT) 31 U/L      Alkaline Phosphatase 66 U/L      Total Bilirubin 0.5 mg/dL      eGFR Non African Amer 80 mL/min/1.73      Globulin 3.1 gm/dL      A/G Ratio 1.3 g/dL       BUN/Creatinine Ratio 25.7 (H)     Anion Gap 10.0 mmol/L     Lipase [[id number]]  (Normal) Collected:  02/19/18 0930    Specimen:  Blood Updated:  02/19/18 0959     Lipase 106 U/L     Urinalysis With / Culture If Indicated - Urine, Clean Catch [[id number]]  (Normal) Collected:  02/19/18 0944    Specimen:  Urine from Urine, Clean Catch Updated:  02/19/18 1001     Color, UA Yellow     Appearance, UA Clear     pH, UA 5.5     Specific Gravity, UA 1.023     Glucose, UA Negative     Ketones, UA Negative     Bilirubin, UA Negative     Blood, UA Negative     Protein, UA Negative     Leuk Esterase, UA Negative     Nitrite, UA Negative     Urobilinogen, UA 0.2 E.U./dL    Narrative:       Urine microscopic not indicated.    Troponin [[id number]]  (Normal) Collected:  02/19/18 0930    Specimen:  Blood Updated:  02/19/18 1011     Troponin I <0.012 ng/mL     BNP [[id number]]  (Abnormal) Collected:  02/19/18 0930    Specimen:  Blood Updated:  02/19/18 1011     proBNP 967.0 (H) pg/mL     Protime-INR [[id number]]  (Normal) Collected:  02/19/18 0930    Specimen:  Blood Updated:  02/19/18 1017     Protime 12.6 Seconds      INR 0.95    Narrative:       Therapeutic range for most indications is 2.0-3.0 INR,  or 2.5-3.5 for mechanical heart valves.    Lavender Top [[id number]] Collected:  02/19/18 0930    Specimen:  Blood Updated:  02/19/18 1031     Extra Tube hold for add-on      Auto resulted       [place] Draw [[id number]] Collected:  02/19/18 0930    Specimen:  Blood Updated:  02/19/18 1031    Narrative:       The following orders were created for panel order [place] Draw.  Procedure                               Abnormality         Status                     ---------                               -----------         ------                     Light Blue Top[[id number]]                                   Final result               Green Top (Gel)[[id number]]                                  Final result               Lavender Top[[id number]]                                      Final result               Gold Top - SST[[id number]]                                   Final result                 Please view results for these tests on the individual orders.    Light Blue Top [[id number]] Collected:  02/19/18 0930    Specimen:  Blood Updated:  02/19/18 1031     Extra Tube hold for add-on      Auto resulted       Green Top (Gel) [[id number]] Collected:  02/19/18 0930    Specimen:  Blood Updated:  02/19/18 1031     Extra Tube Hold for add-ons.      Auto resulted.       Gold Top - SST [[id number]] Collected:  02/19/18 0930    Specimen:  Blood Updated:  02/19/18 1031     Extra Tube Hold for add-ons.      Auto resulted.       Troponin [[id number]]  (Normal) Collected:  02/19/18 1223    Specimen:  Blood Updated:  02/19/18 1301     Troponin I <0.012 ng/mL         Imaging Results (last 24 hours)     Procedure Component Value Units Date/Time    XR Chest 1 View [[id number]] Collected:  02/19/18 0929     Updated:  02/19/18 0947    Narrative:         PROCEDURE: Single chest view AP    REASON FOR EXAM:Chest pain protocol  chest pain protocol    FINDINGS: Comparison exam dated December 1, 2017. Cardiac and  pulmonary vasculature are normal. Left upper lung field small  calcified lung parenchymal granuloma consistent with old  granulomatous disease. Lungs otherwise clear. Left hemidiaphragm  small linear foci of calcification not appreciably changed since  prior exam. No acute osseous abnormality.      Impression:       1.  Evidence of old granulomatous disease.  2.  Stable left hemidiaphragm small linear foci of calcification  suspicious for small calcified pleural plaque. This may be  secondary to prior inflammation versus posttraumatic changes  versus less likely asbestosis exposure.  3.  No acute cardiopulmonary abnormality.    Electronically signed by:  Jacob Vu MD  2/19/2018 9:46 AM CST  Workstation: [id number]        Active Hospital Problems (** Indicates Principal Problem)     Diagnosis Date Noted   • Panlobular emphysema [J43.1] 08/19/2016   • COPD (chronic obstructive pulmonary disease) [J44.9] 08/19/2016   • Chest pain [R07.9] 08/07/2013      Resolved Hospital Problems    Diagnosis Date Noted Date Resolved   No resolved problems to display.       Plan:  1.  Chest pain, most likely related to emphysema:  Will monitor serial cardiac enzymes and EKG.  Continuous telemetry monitoring.  Patient had a clean heart cath done last month by her cardiologist Dr. Farnsworth in [place]. Will start IV steroids. Morphine for pleuritic pain.    2.  Shortness of breath/ COPD/ Panlobular emphysema:  Continue bronchodilators.  Will rule out PE.        I discussed the patients findings and my recommendations with: Patient and daughter.        This document has been electronically signed by BERNARDA Weiss on February 19, 2018 4:00 PM

## 2018-02-19 NOTE — ED PROVIDER NOTES
Subjective   HPI Comments: Patient presents with the chest pain and shortness of breath.  Patient is the chest pain started last night.  Had been waxing and waning and keeping her awake.  Patient states that she had similar symptoms approximately 1 month ago.  She was seen in [place] at that time the cardiologist there taken her to the Cath Lab who noted the she did have coronary artery disease but it was nonobstructive at that time patient not receive any stents was placed on medical management.  Patient at that time it coming with 10 out of 10 chest pain to [place].  Patient today presents with 0 out of 10 pain at this time.  Patient was short of breath when she was picked up by the paramedics.  Patient is on home O2.  On her home O2 patient was noted to be at 83% with increased work of breathing on arrival.  The patient received a steroid and breathing treatment on the way to the ED.  Patient also received aspirin.  Patient denies recent fevers chills.  Patient denies nausea vomiting bowel or bladder changes.  No neurologic symptoms.      History provided by:  Patient   used: No        Review of Systems   Constitutional: Negative.  Negative for appetite change, chills and fever.   HENT: Negative.  Negative for congestion.    Eyes: Negative.  Negative for photophobia and visual disturbance.   Respiratory: Positive for shortness of breath. Negative for cough and chest tightness.    Cardiovascular: Positive for chest pain. Negative for palpitations.   Gastrointestinal: Negative.  Negative for abdominal pain, constipation, diarrhea, nausea and vomiting.   Endocrine: Negative.    Genitourinary: Negative.  Negative for decreased urine volume, dysuria, flank pain and hematuria.   Musculoskeletal: Negative.  Negative for arthralgias, back pain, myalgias, neck pain and neck stiffness.   Skin: Negative.  Negative for pallor.   Neurological: Negative.  Negative for dizziness, syncope, weakness,  light-headedness, numbness and headaches.   Psychiatric/Behavioral: Negative.  Negative for confusion and suicidal ideas. The patient is not nervous/anxious.        Past Medical History:   Diagnosis Date   • COPD (chronic obstructive pulmonary disease)    • Emphysema of lung    • SOB (shortness of breath)    • Tobacco use    • Vaginal bleeding        Allergies   Allergen Reactions   • Amitriptyline    • Codeine    • Nortriptyline    • Tricyclic Antidepressants    • Tylenol [Acetaminophen] Nausea Only       Past Surgical History:   Procedure Laterality Date   • HYSTERECTOMY      Ovary Preserv, age 16       Family History   Problem Relation Age of Onset   • Family history unknown: Yes       Social History     Social History   • Marital status: Single     Spouse name: N/A   • Number of children: N/A   • Years of education: N/A     Social History Main Topics   • Smoking status: Current Every Day Smoker   • Smokeless tobacco: Current User   • Alcohol use No   • Drug use: No   • Sexual activity: Not Asked     Other Topics Concern   • None     Social History Narrative           Objective   Physical Exam   Constitutional: She is oriented to person, place, and time. She appears well-developed and well-nourished. She appears distressed.   Thin, cachectic appearing.   HENT:   Head: Normocephalic and atraumatic.   Nose: Nose normal.   Mouth/Throat: Oropharynx is clear and moist.   Eyes: Conjunctivae and EOM are normal. No scleral icterus.   Neck: Normal range of motion. Neck supple. No JVD present.   Cardiovascular: Normal rate, regular rhythm, normal heart sounds and intact distal pulses.  Exam reveals no gallop and no friction rub.    No murmur heard.  Pulmonary/Chest: She is in respiratory distress. She has no wheezes. She has no rales. She exhibits no tenderness.   Mild respiratory distress with increased work of breathing.  There is distant breath sounds consistent with patient's COPD without rhonchi or wheeze at this  time.   Abdominal: Soft. She exhibits no distension and no mass. There is no tenderness. There is no rebound and no guarding.   Musculoskeletal: Normal range of motion. She exhibits no edema, tenderness or deformity.   Lymphadenopathy:     She has no cervical adenopathy.   Neurological: She is alert and oriented to person, place, and time. No cranial nerve deficit. She exhibits normal muscle tone.   Skin: Skin is warm and dry. No rash noted. She is not diaphoretic. No erythema. No pallor.   Psychiatric: She has a normal mood and affect. Her behavior is normal. Judgment and thought content normal.   Nursing note and vitals reviewed.      Procedures         ED Course  ED Course      Labs Reviewed   COMPREHENSIVE METABOLIC PANEL - Abnormal; Notable for the following:        Result Value    CO2 33.0 (*)     BUN/Creatinine Ratio 25.7 (*)     All other components within normal limits   BNP (IN-HOUSE) - Abnormal; Notable for the following:     proBNP 967.0 (*)     All other components within normal limits   CBC WITH AUTO DIFFERENTIAL - Abnormal; Notable for the following:     RDW-SD 49.0 (*)     All other components within normal limits   TROPONIN (IN-HOUSE) - Normal   PROTIME-INR - Normal    Narrative:     Therapeutic range for most indications is 2.0-3.0 INR,  or 2.5-3.5 for mechanical heart valves.   URINALYSIS W/ CULTURE IF INDICATED - Normal    Narrative:     Urine microscopic not indicated.   LIPASE - Normal   RAINBOW DRAW    Narrative:     The following orders were created for panel order [place] Draw.  Procedure                               Abnormality         Status                     ---------                               -----------         ------                     Light Blue Top[[id number]]                                   Final result               Green Top (Gel)[[id number]]                                  Final result               Lavender Top[[id number]]                                     Final result                Gold Top - SST[[id number]]                                   Final result                 Please view results for these tests on the individual orders.   TROPONIN (IN-HOUSE)   CBC AND DIFFERENTIAL    Narrative:     The following orders were created for panel order CBC & Differential.  Procedure                               Abnormality         Status                     ---------                               -----------         ------                     CBC Auto Differential[[id number]]        Abnormal            Final result                 Please view results for these tests on the individual orders.   LIGHT BLUE TOP   GREEN TOP   LAVENDER TOP   GOLD TOP - SST       XR Chest 1 View   Final Result   1.  Evidence of old granulomatous disease.   2.  Stable left hemidiaphragm small linear foci of calcification   suspicious for small calcified pleural plaque. This may be   secondary to prior inflammation versus posttraumatic changes   versus less likely asbestosis exposure.   3.  No acute cardiopulmonary abnormality.      Electronically signed by:  Jacob Vu MD  2/19/2018 9:46 AM CST   Workstation: [id number]        Patient with EKG changes in comparison to EKG that was done in March of she's been December 2017 patient with the T wave inversions in V3 through V6 with some minor flattening/inversions in 23 and aVF.  There is no ST elevation.  Patient was some mild sinus bradycardia.  Patient has had workup in the past 2 months an outside institution.  Her catheter while it showed no blockages did show atherosclerotic disease but to what extent it is not documented in the care everywhere documentation.  With EKG findings in the patient's complaints of chest pain, though chest pain-free now,  will admit for observation.  Patient's hypoxia has improved on T supplemental O2 from 83-98%.            MDM    Final diagnoses:   Chest pain, unspecified type   Dyspnea, unspecified type   COPD with hypoxia             Manuel Seymour MD  02/19/18 112

## 2018-02-20 ENCOUNTER — APPOINTMENT (OUTPATIENT)
Dept: CARDIOLOGY | Facility: HOSPITAL | Age: 58
End: 2018-02-20

## 2018-02-20 VITALS
WEIGHT: 115.08 LBS | TEMPERATURE: 98.1 F | HEART RATE: 72 BPM | RESPIRATION RATE: 18 BRPM | OXYGEN SATURATION: 98 % | HEIGHT: 62 IN | DIASTOLIC BLOOD PRESSURE: 51 MMHG | SYSTOLIC BLOOD PRESSURE: 110 MMHG | BODY MASS INDEX: 21.18 KG/M2

## 2018-02-20 LAB
ANION GAP SERPL CALCULATED.3IONS-SCNC: 12 MMOL/L (ref 5–15)
BASOPHILS # BLD AUTO: 0 10*3/MM3 (ref 0–0.2)
BASOPHILS NFR BLD AUTO: 0 % (ref 0–2)
BH CV ECHO MEAS - ACS: 1.9 CM
BH CV ECHO MEAS - AO MAX PG (FULL): 2.2 MMHG
BH CV ECHO MEAS - AO MAX PG: 14.9 MMHG
BH CV ECHO MEAS - AO MEAN PG (FULL): 3 MMHG
BH CV ECHO MEAS - AO MEAN PG: 8 MMHG
BH CV ECHO MEAS - AO ROOT AREA (BSA CORRECTED): 2.1
BH CV ECHO MEAS - AO ROOT AREA: 7.5 CM^2
BH CV ECHO MEAS - AO ROOT DIAM: 3.1 CM
BH CV ECHO MEAS - AO V2 MAX: 193 CM/SEC
BH CV ECHO MEAS - AO V2 MEAN: 130 CM/SEC
BH CV ECHO MEAS - AO V2 VTI: 34.6 CM
BH CV ECHO MEAS - AVA(I,A): 2.6 CM^2
BH CV ECHO MEAS - AVA(I,D): 2.6 CM^2
BH CV ECHO MEAS - AVA(V,A): 2.6 CM^2
BH CV ECHO MEAS - AVA(V,D): 2.6 CM^2
BH CV ECHO MEAS - BSA(HAYCOCK): 1.5 M^2
BH CV ECHO MEAS - BSA: 1.5 M^2
BH CV ECHO MEAS - BZI_BMI: 21 KILOGRAMS/M^2
BH CV ECHO MEAS - BZI_METRIC_HEIGHT: 157.5 CM
BH CV ECHO MEAS - BZI_METRIC_WEIGHT: 52.2 KG
BH CV ECHO MEAS - EDV(CUBED): 85.2 ML
BH CV ECHO MEAS - EDV(TEICH): 87.7 ML
BH CV ECHO MEAS - EF(CUBED): 76.9 %
BH CV ECHO MEAS - EF(TEICH): 69.2 %
BH CV ECHO MEAS - ESV(CUBED): 19.7 ML
BH CV ECHO MEAS - ESV(TEICH): 27 ML
BH CV ECHO MEAS - FS: 38.6 %
BH CV ECHO MEAS - IVS/LVPW: 1
BH CV ECHO MEAS - IVSD: 0.8 CM
BH CV ECHO MEAS - LA DIMENSION: 3.6 CM
BH CV ECHO MEAS - LA/AO: 1.2
BH CV ECHO MEAS - LV MASS(C)D: 109.4 GRAMS
BH CV ECHO MEAS - LV MASS(C)DI: 72.4 GRAMS/M^2
BH CV ECHO MEAS - LV MAX PG: 12.7 MMHG
BH CV ECHO MEAS - LV MEAN PG: 5 MMHG
BH CV ECHO MEAS - LV V1 MAX: 178 CM/SEC
BH CV ECHO MEAS - LV V1 MEAN: 99.8 CM/SEC
BH CV ECHO MEAS - LV V1 VTI: 31.4 CM
BH CV ECHO MEAS - LVIDD: 4.4 CM
BH CV ECHO MEAS - LVIDS: 2.7 CM
BH CV ECHO MEAS - LVOT AREA (M): 2.8 CM^2
BH CV ECHO MEAS - LVOT AREA: 2.8 CM^2
BH CV ECHO MEAS - LVOT DIAM: 1.9 CM
BH CV ECHO MEAS - LVPWD: 0.8 CM
BH CV ECHO MEAS - MV A MAX VEL: 112 CM/SEC
BH CV ECHO MEAS - MV DEC SLOPE: 731 CM/SEC^2
BH CV ECHO MEAS - MV E MAX VEL: 123 CM/SEC
BH CV ECHO MEAS - MV E/A: 1.1
BH CV ECHO MEAS - MV MAX PG: 5.3 MMHG
BH CV ECHO MEAS - MV MEAN PG: 3 MMHG
BH CV ECHO MEAS - MV P1/2T MAX VEL: 116 CM/SEC
BH CV ECHO MEAS - MV P1/2T: 46.5 MSEC
BH CV ECHO MEAS - MV V2 MAX: 115 CM/SEC
BH CV ECHO MEAS - MV V2 MEAN: 76.9 CM/SEC
BH CV ECHO MEAS - MV V2 VTI: 30 CM
BH CV ECHO MEAS - MVA P1/2T LCG: 1.9 CM^2
BH CV ECHO MEAS - MVA(P1/2T): 4.7 CM^2
BH CV ECHO MEAS - MVA(VTI): 3 CM^2
BH CV ECHO MEAS - PA MAX PG: 6.3 MMHG
BH CV ECHO MEAS - PA V2 MAX: 125 CM/SEC
BH CV ECHO MEAS - RAP SYSTOLE: 5 MMHG
BH CV ECHO MEAS - RVDD: 2.4 CM
BH CV ECHO MEAS - RVSP: 48.8 MMHG
BH CV ECHO MEAS - SI(AO): 172.8 ML/M^2
BH CV ECHO MEAS - SI(CUBED): 43.4 ML/M^2
BH CV ECHO MEAS - SI(LVOT): 58.9 ML/M^2
BH CV ECHO MEAS - SI(TEICH): 40.2 ML/M^2
BH CV ECHO MEAS - SV(AO): 261.2 ML
BH CV ECHO MEAS - SV(CUBED): 65.5 ML
BH CV ECHO MEAS - SV(LVOT): 89 ML
BH CV ECHO MEAS - SV(TEICH): 60.7 ML
BH CV ECHO MEAS - TR MAX VEL: 331 CM/SEC
BUN BLD-MCNC: 22 MG/DL (ref 7–21)
BUN/CREAT SERPL: 33.3 (ref 7–25)
CALCIUM SPEC-SCNC: 9.3 MG/DL (ref 8.4–10.2)
CHLORIDE SERPL-SCNC: 101 MMOL/L (ref 95–110)
CO2 SERPL-SCNC: 26 MMOL/L (ref 22–31)
CREAT BLD-MCNC: 0.66 MG/DL (ref 0.5–1)
DEPRECATED RDW RBC AUTO: 46.1 FL (ref 36.4–46.3)
EOSINOPHIL # BLD AUTO: 0 10*3/MM3 (ref 0–0.7)
EOSINOPHIL NFR BLD AUTO: 0 % (ref 0–7)
ERYTHROCYTE [DISTWIDTH] IN BLOOD BY AUTOMATED COUNT: 13.8 % (ref 11.5–14.5)
GFR SERPL CREATININE-BSD FRML MDRD: 92 ML/MIN/1.73 (ref 51–120)
GLUCOSE BLD-MCNC: 154 MG/DL (ref 60–100)
HCT VFR BLD AUTO: 32.1 % (ref 35–45)
HGB BLD-MCNC: 10.9 G/DL (ref 12–15.5)
IMM GRANULOCYTES # BLD: 0.01 10*3/MM3 (ref 0–0.02)
IMM GRANULOCYTES NFR BLD: 0.2 % (ref 0–0.5)
LV EF 2D ECHO EST: 65 %
LYMPHOCYTES # BLD AUTO: 1.02 10*3/MM3 (ref 0.6–4.2)
LYMPHOCYTES NFR BLD AUTO: 16.9 % (ref 10–50)
MAXIMAL PREDICTED HEART RATE: 161 BPM
MCH RBC QN AUTO: 30.6 PG (ref 26.5–34)
MCHC RBC AUTO-ENTMCNC: 34 G/DL (ref 31.4–36)
MCV RBC AUTO: 90.2 FL (ref 80–98)
MONOCYTES # BLD AUTO: 0.1 10*3/MM3 (ref 0–0.9)
MONOCYTES NFR BLD AUTO: 1.7 % (ref 0–12)
NEUTROPHILS # BLD AUTO: 4.9 10*3/MM3 (ref 2–8.6)
NEUTROPHILS NFR BLD AUTO: 81.2 % (ref 37–80)
PLATELET # BLD AUTO: 210 10*3/MM3 (ref 150–450)
PMV BLD AUTO: 10.3 FL (ref 8–12)
POTASSIUM BLD-SCNC: 3.5 MMOL/L (ref 3.5–5.1)
RBC # BLD AUTO: 3.56 10*6/MM3 (ref 3.77–5.16)
SODIUM BLD-SCNC: 139 MMOL/L (ref 137–145)
STRESS TARGET HR: 137 BPM
TROPONIN I SERPL-MCNC: <0.012 NG/ML
WBC NRBC COR # BLD: 6.03 10*3/MM3 (ref 3.2–9.8)

## 2018-02-20 PROCEDURE — 25010000002 METHYLPREDNISOLONE PER 125 MG: Performed by: NURSE PRACTITIONER

## 2018-02-20 PROCEDURE — 93306 TTE W/DOPPLER COMPLETE: CPT | Performed by: INTERNAL MEDICINE

## 2018-02-20 PROCEDURE — 80048 BASIC METABOLIC PNL TOTAL CA: CPT | Performed by: NURSE PRACTITIONER

## 2018-02-20 PROCEDURE — 84484 ASSAY OF TROPONIN QUANT: CPT | Performed by: NURSE PRACTITIONER

## 2018-02-20 PROCEDURE — 85025 COMPLETE CBC W/AUTO DIFF WBC: CPT | Performed by: NURSE PRACTITIONER

## 2018-02-20 PROCEDURE — 96376 TX/PRO/DX INJ SAME DRUG ADON: CPT

## 2018-02-20 PROCEDURE — 94760 N-INVAS EAR/PLS OXIMETRY 1: CPT

## 2018-02-20 PROCEDURE — 93306 TTE W/DOPPLER COMPLETE: CPT

## 2018-02-20 PROCEDURE — 94799 UNLISTED PULMONARY SVC/PX: CPT

## 2018-02-20 PROCEDURE — G0378 HOSPITAL OBSERVATION PER HR: HCPCS

## 2018-02-20 RX ORDER — PREDNISONE 10 MG/1
TABLET ORAL
Qty: 21 TABLET | Refills: 0 | Status: ON HOLD | OUTPATIENT
Start: 2018-02-20 | End: 2018-07-18

## 2018-02-20 RX ADMIN — METHYLPREDNISOLONE SODIUM SUCCINATE 60 MG: 125 INJECTION, POWDER, FOR SOLUTION INTRAMUSCULAR; INTRAVENOUS at 00:15

## 2018-02-20 RX ADMIN — CLOPIDOGREL BISULFATE 75 MG: 75 TABLET ORAL at 08:49

## 2018-02-20 RX ADMIN — METHYLPREDNISOLONE SODIUM SUCCINATE 60 MG: 125 INJECTION, POWDER, FOR SOLUTION INTRAMUSCULAR; INTRAVENOUS at 05:00

## 2018-02-20 RX ADMIN — BUDESONIDE AND FORMOTEROL FUMARATE DIHYDRATE 2 PUFF: 80; 4.5 AEROSOL RESPIRATORY (INHALATION) at 07:15

## 2018-02-20 RX ADMIN — IPRATROPIUM BROMIDE AND ALBUTEROL SULFATE 3 ML: 2.5; .5 SOLUTION RESPIRATORY (INHALATION) at 07:13

## 2018-02-20 RX ADMIN — PANTOPRAZOLE SODIUM 40 MG: 40 TABLET, DELAYED RELEASE ORAL at 06:20

## 2018-02-20 RX ADMIN — RAMIPRIL 1.25 MG: 1.25 CAPSULE ORAL at 08:49

## 2018-02-20 RX ADMIN — ATENOLOL 25 MG: 25 TABLET ORAL at 08:49

## 2018-02-20 RX ADMIN — IPRATROPIUM BROMIDE AND ALBUTEROL SULFATE 3 ML: 2.5; .5 SOLUTION RESPIRATORY (INHALATION) at 10:21

## 2018-02-20 RX ADMIN — ASPIRIN 81 MG: 81 TABLET, COATED ORAL at 08:49

## 2018-02-20 RX ADMIN — HYDROXYZINE PAMOATE 50 MG: 25 CAPSULE ORAL at 08:49

## 2018-02-20 RX ADMIN — ESCITALOPRAM OXALATE 10 MG: 10 TABLET ORAL at 06:20

## 2018-02-20 RX ADMIN — METHYLPREDNISOLONE SODIUM SUCCINATE 60 MG: 125 INJECTION, POWDER, FOR SOLUTION INTRAMUSCULAR; INTRAVENOUS at 13:01

## 2018-02-20 NOTE — PLAN OF CARE
Problem: Patient Care Overview (Adult)  Goal: Plan of Care Review  Outcome: Ongoing (interventions implemented as appropriate)    Goal: Adult Individualization and Mutuality  Outcome: Ongoing (interventions implemented as appropriate)    Goal: Discharge Needs Assessment  Outcome: Ongoing (interventions implemented as appropriate)      Problem: Acute Coronary Syndrome (ACS) (Adult)  Goal: Signs and Symptoms of Listed Potential Problems Will be Absent or Manageable (Acute Coronary Syndrome)  Outcome: Ongoing (interventions implemented as appropriate)      Problem: COPD, Chronic Bronchitis/Emphysema (Adult)  Goal: Signs and Symptoms of Listed Potential Problems Will be Absent or Manageable (COPD, Chronic Bronchitis/Emphysema)  Outcome: Ongoing (interventions implemented as appropriate)

## 2018-02-20 NOTE — PLAN OF CARE
Problem: Patient Care Overview (Adult)  Goal: Plan of Care Review  Outcome: Ongoing (interventions implemented as appropriate)   02/20/18 1436   Coping/Psychosocial Response Interventions   Plan Of Care Reviewed With patient   Patient Care Overview   Progress no change   Outcome Evaluation   Outcome Summary/Follow up Plan patient had echo 2/20 waiting for results; patient on home o2 setting;      Goal: Adult Individualization and Mutuality  Outcome: Ongoing (interventions implemented as appropriate)    Goal: Discharge Needs Assessment  Outcome: Ongoing (interventions implemented as appropriate)      Problem: Acute Coronary Syndrome (ACS) (Adult)  Goal: Signs and Symptoms of Listed Potential Problems Will be Absent or Manageable (Acute Coronary Syndrome)  Outcome: Ongoing (interventions implemented as appropriate)      Problem: COPD, Chronic Bronchitis/Emphysema (Adult)  Goal: Signs and Symptoms of Listed Potential Problems Will be Absent or Manageable (COPD, Chronic Bronchitis/Emphysema)  Outcome: Ongoing (interventions implemented as appropriate)

## 2018-02-20 NOTE — PLAN OF CARE
Problem: Patient Care Overview (Adult)  Goal: Plan of Care Review  Outcome: Ongoing (interventions implemented as appropriate)    Goal: Adult Individualization and Mutuality  Outcome: Ongoing (interventions implemented as appropriate)      Problem: Acute Coronary Syndrome (ACS) (Adult)  Goal: Signs and Symptoms of Listed Potential Problems Will be Absent or Manageable (Acute Coronary Syndrome)  Outcome: Ongoing (interventions implemented as appropriate)      Problem: COPD, Chronic Bronchitis/Emphysema (Adult)  Goal: Signs and Symptoms of Listed Potential Problems Will be Absent or Manageable (COPD, Chronic Bronchitis/Emphysema)  Outcome: Ongoing (interventions implemented as appropriate)

## 2018-02-20 NOTE — PLAN OF CARE
Problem: Patient Care Overview (Adult)  Goal: Plan of Care Review  Outcome: Ongoing (interventions implemented as appropriate)  Encourage intake of meals and supplement.   02/20/18 1441   Coping/Psychosocial Response Interventions   Plan Of Care Reviewed With patient   Patient Care Overview   Progress no change   Outcome Evaluation   Outcome Summary/Follow up Plan initial assessment

## 2018-02-20 NOTE — CONSULTS
"Adult Nutrition  Assessment    Patient Name:  Prabha Gonzalez  YOB: 1960  MRN: [id number]  Admit Date:  2/19/2018    Assessment Date:  2/20/2018    Comments:  Daughter present.  Pt reports poor appetite.  Wt reported to range from 114 - 120 lb. Pt voiced food preferences.  Willing to receive Ensure with meals.  Intake 100% - 1x.  Pt reports hx nausea, dry heaves.  Protonix, PRN Zofran prescribed.  Pt has problem chewing but wants regular consistency food.          Reason for Assessment       02/20/18 1429    Reason for Assessment    Reason For Assessment/Visit identified at risk by screening criteria    Identified At Risk By Screening Criteria difficulty chewing;unintentional loss of 10 lbs or more in the past 2 mos                  Anthropometrics       02/20/18 1429    Anthropometrics    Height 157.5 cm (62.01\")    Weight 52.2 kg (115 lb 1.3 oz)    Ideal Body Weight (IBW)    Ideal Body Weight (IBW), Female 50.85    % Ideal Body Weight 102.87    Body Mass Index (BMI)    BMI (kg/m2) 21.09    BMI Grade 19.1 - 24.9 - normal            Labs/Tests/Procedures/Meds       02/20/18 1430    Labs/Tests/Procedures/Meds    Labs/Tests Review Glucose;Hgb Hct;BUN    Medication Review Reviewed, pertinent              Estimated/Assessed Needs       02/20/18 1431    Calculation Measurements    Weight Used For Calculations 52.2 kg (115 lb 1.3 oz)    Height Used for Calculations 1.575 m (5' 2.01\")    Estimated/Assessed Energy Needs    Energy Need Method Solano-St or    Age 57    RMR (Solano-St. Jeor Equation) 1050.38    Activity Factors ([place] [facility] or)  Out of bed, ambulatory  1.3    Total estimated needs ([place] St. Jeor) 1365    Estimated/Assessed Protein Needs    Weight Used for Protein Calculation 52.2 kg (115 lb 1.3 oz)    Protein (gm/kg) 1.0    1.0 Gm Protein (gm) 52.2    Estimated/Assessed Fluid Needs    Fluid Need Method --   1566            Nutrition Prescription Ordered       02/20/18 1433    Nutrition " Prescription PO    Current PO Diet Regular            Evaluation of Received Nutrient/Fluid Intake       02/20/18 1433    PO Evaluation    Number of Meals 1    % PO Intake 100            Electronically signed by:  Veronica Valentine RD  02/20/18 2:34 PM

## 2018-02-20 NOTE — DISCHARGE SUMMARY
[facility]  DISCHARGE SUMMARY       Date of Admission: 2/19/2018  Date of Discharge:  2/20/2018  Primary Care Physician: BERNARDA Quijano    Presenting Problem/History of Present Illness:  COPD with hypoxia [J44.9, R09.02]  Dyspnea, unspecified type [R06.00]  Chest pain, unspecified type [R07.9]       Final Discharge Diagnoses:  Hospital Problem List     Panlobular emphysema    COPD (chronic obstructive pulmonary disease)    Chest pain          Consults:   Consults     Date and Time Order Name Status Description    2/19/2018 1125 Hospitalist (on-call MD unless specified)                      Pertinent Test Results:   Lab Results (last 24 hours)     Procedure Component Value Units Date/Time    Troponin [[id number]]  (Normal) Collected:  02/19/18 1639    Specimen:  Blood Updated:  02/19/18 1750     Troponin I <0.012 ng/mL     D-dimer, Quantitative [[id number]]  (Abnormal) Collected:  02/19/18 0930    Specimen:  Blood Updated:  02/19/18 1758     D-Dimer, Quantitative 833 (H) ng/mL (FEU)     Narrative:       Dimer values <500 ng/ml FEU are FDA approved as aid in diagnosis of deep venous thrombosis and pulmonary embolism.  This test should not be used in an exclusion strategy with pretest probability alone.    A recent guideline regarding diagnosis for pulmonary thomboembolism recommends an adjusted exclusion criterion of age x 10 ng/ml FEU for patients >50 years of age (Deanna Intern Med 2015; 163: 701-711).    Extra Tubes [[id number]] Collected:  02/19/18 1649    Specimen:  Blood from Blood, Venous Line Updated:  02/19/18 1801    Narrative:       The following orders were created for panel order Extra Tubes.  Procedure                               Abnormality         Status                     ---------                               -----------         ------                     Light Blue Top[[id number]]                                   Final result                  Please view results for these tests on the individual orders.    Light Blue Top [[id number]] Collected:  02/19/18 1649    Specimen:  Blood Updated:  02/19/18 1801     Extra Tube hold for add-on      Auto resulted       Troponin [[id number]]  (Normal) Collected:  02/19/18 2153    Specimen:  Blood Updated:  02/19/18 2236     Troponin I <0.012 ng/mL     CBC & Differential [[id number]] Collected:  02/20/18 0356    Specimen:  Blood Updated:  02/20/18 0407    Narrative:       The following orders were created for panel order CBC & Differential.  Procedure                               Abnormality         Status                     ---------                               -----------         ------                     CBC Auto Differential[[id number]]        Abnormal            Final result                 Please view results for these tests on the individual orders.    CBC Auto Differential [[id number]]  (Abnormal) Collected:  02/20/18 0356    Specimen:  Blood Updated:  02/20/18 0407     WBC 6.03 10*3/mm3      RBC 3.56 (L) 10*6/mm3      Hemoglobin 10.9 (L) g/dL      Hematocrit 32.1 (L) %      MCV 90.2 fL      MCH 30.6 pg      MCHC 34.0 g/dL      RDW 13.8 %      RDW-SD 46.1 fl      MPV 10.3 fL      Platelets 210 10*3/mm3      Neutrophil % 81.2 (H) %      Lymphocyte % 16.9 %      Monocyte % 1.7 %      Eosinophil % 0.0 %      Basophil % 0.0 %      Immature Grans % 0.2 %      Neutrophils, Absolute 4.90 10*3/mm3      Lymphocytes, Absolute 1.02 10*3/mm3      Monocytes, Absolute 0.10 10*3/mm3      Eosinophils, Absolute 0.00 10*3/mm3      Basophils, Absolute 0.00 10*3/mm3      Immature Grans, Absolute 0.01 10*3/mm3     Basic Metabolic Panel [[id number]]  (Abnormal) Collected:  02/20/18 0356    Specimen:  Blood Updated:  02/20/18 0431     Glucose 154 (H) mg/dL      BUN 22 (H) mg/dL      Creatinine 0.66 mg/dL      Sodium 139 mmol/L      Potassium 3.5 mmol/L      Chloride 101 mmol/L      CO2 26.0 mmol/L      Calcium 9.3 mg/dL      eGFR  Non African Amer 92 mL/min/1.73      BUN/Creatinine Ratio 33.3 (H)     Anion Gap 12.0 mmol/L     Troponin [[id number]]  (Normal) Collected:  02/20/18 0356    Specimen:  Blood Updated:  02/20/18 0443     Troponin I <0.012 ng/mL         Imaging Results (last 24 hours)     Procedure Component Value Units Date/Time    CT Angiogram Chest With Contrast [[id number]] Collected:  02/19/18 1946     Updated:  02/19/18 2024    Narrative:         EXAM:  Computed Tomography with CTA         REGION:  Chest       INDICATION:   Dyspnea, cardiac origin suspected, R07.9 Chest  pain, unspecified R06.00 Dyspnea, unspecified J44.9 Chronic  obstructive pulmonary disease, unspecified R09.02 Hypoxemia    - rule out pulmonary embolism       CLINICAL HISTORY:  CORRELATIVE IMAGING:  None                         TECHNIQUE:     - PE / vascular protocol     - reconstructions:  axial, coronal, sagittal, obliques     - computer-generated 3D reconstructions (MIPS) were performed.     - contrast:  intravenous Isovue 370, 59 mL                                 This exam was performed according to the departmental  dose-optimization program which includes automated exposure  control, adjustment of the mA and/or kV according to patient size  and/or use of iterative reconstruction technique.         COMMENTS:    - Pulmonary arterial system:      - Main pulmonary artery trunk:  negative      - Left, right main pulmonary arteries: negative      - Lobar arteries: negative       - Segmental arteries: negative      - Systemic vascularity (as visualized):        - Aorta:  grossly negative / normal caliber / no dissection        - roots of great vessels:  grossly negative / normal  caliber        - SVC / IVC:  grossly negative / normal caliber     - Misc (limited visualization):      - pulmonary parenchyma:  Emphysema with hyperexpansion      - pleura:  negative      - mediastinal / chad:  negative      - neck, inferior:  grossly wnl      - subdiaphragmatic  "structures:  grossly negative (limited  evaluation)       - osseous:      - misc:       .        Impression:       CONCLUSION:          1.  No evidence of pulmonary embolism.            2.  No evidence of pathology associated with the visualized  aorta.        3. Emphysema with hyperexpansion.                                      Electronically signed by:  ATILIO Dowling MD  2/19/2018 8:23  PM CST Workstation: [id number]        Chief Complaint on Day of Discharge: No complaints    Hospital Course:  The patient is a 57 y.o. female with history of panlobular emphysema presented to [facility] on 2/19/2018 with complaints of chest pain and shortness of air.  The patient follows with cardiologist Dr. Sandoval in [place].  She had a heart catheter previously done 01/10/2018 that showed nonobstructive CAD and a normal left ventricular systolic function.  The patient follows with Dr. Sang Marcos for emphysema and per his office note she continues to have shortness of breath and dyspnea with minimal exertional dyspnea despite taking bronchodilators.  Patient had a mildly elevated d-dimer and a CTA of the chest was done and pulmonary embolism was ruled out.  Serial cardiac enzymes and EKG were unremarkable.  Echocardiogram was normal.  A prescription was given for a prednisone taper pack and the patient will follow-up with Dr. Sang Marcos at her already scheduled appointment.  She will follow up with her primary care physician within one week.  She will follow-up with her cardiologist at her are ready scheduled appointment in April.  The patient states her chest pain has resolved and she is breathing better.    Condition on Discharge:  Stable    Physical Exam on Discharge:  /46 (BP Location: Right arm, Patient Position: Sitting)  Pulse 73  Temp 98.4 °F (36.9 °C) (Temporal Artery )   Resp 18  Ht 157.5 cm (62.01\")  Wt 52.2 kg (115 lb 1.3 oz)  SpO2 99%  BMI 21.04 kg/m2  Physical Exam "   Constitutional: She is oriented to person, place, and time. She appears well-developed and well-nourished.   HENT:   Head: Normocephalic and atraumatic.   Eyes: EOM are normal. Pupils are equal, round, and reactive to light.   Neck: Normal range of motion. Neck supple.   Cardiovascular: Normal rate and regular rhythm.    Pulmonary/Chest: Effort normal and breath sounds normal.   Abdominal: Soft. Bowel sounds are normal.   Musculoskeletal: Normal range of motion.   Neurological: She is alert and oriented to person, place, and time.   Skin: Skin is warm.   Psychiatric: She has a normal mood and affect.     Discharge Disposition:  Home or Self Care    Discharge Medications:   Prabha Gonzalez   Home Medication Instructions ALBERTO:[id number]    Printed on:02/20/18 2663   Medication Information                      albuterol (VENTOLIN HFA) 108 (90 Base) MCG/ACT inhaler  2 puffs every 4 hours as needed for breathing             aspirin 81 MG EC tablet  Take 81 mg by mouth Daily.             atenolol (TENORMIN) 25 MG tablet  Take 25 mg by mouth Daily.             budesonide-formoterol (SYMBICORT) 160-4.5 MCG/ACT inhaler  2 puffs twice a day             clopidogrel (PLAVIX) 75 MG tablet  Take 75 mg by mouth Daily.             cycloSPORINE (RESTASIS) 0.05 % ophthalmic emulsion  Administer 1 drop to both eyes 2 (Two) Times a Day.             escitalopram (LEXAPRO) 10 MG tablet  Take 10 mg by mouth Every Morning.             hydrOXYzine (VISTARIL) 50 MG capsule  Take 50 mg by mouth 2 (Two) Times a Day.             ipratropium-albuterol (DUO-NEB) 0.5-2.5 mg/mL nebulizer  USE 1 VIAL IN NEBULIZER FOUR TIMES DAILY             nitroglycerin (NITROSTAT) 0.4 MG SL tablet  Place 0.4 mg under the tongue Every 5 (Five) Minutes As Needed for Chest Pain. Take no more than 3 doses in 15 minutes.             Omega-3 Fatty Acids (OMEGA 3 PO)  Take  by mouth.             OMEPRAZOLE PO  Take 20 mg by mouth Daily As Needed (stomach acid).              Pediatric Multiple Vitamins (THERA MULTI-VITAMIN PO)  Take 1 tablet by mouth Daily.             PredniSONE (DELTASONE) 10 MG (21) tablet pack  Use as directed on package             ramipril (ALTACE) 1.25 MG capsule  Take 1.25 mg by mouth.             rosuvastatin (CRESTOR) 10 MG tablet  Take 10 mg by mouth Every Night.             SYMBICORT 80-4.5 MCG/ACT inhaler  Inhale 2 puffs 2 (Two) Times a Day.                 Discharge Diet:   Diet Instructions     Diet: Regular       Discharge Diet:  Regular                 Activity at Discharge:   Activity Instructions     Activity as Tolerated                     Discharge Care Plan/Instructions: A prescription is given for a prednisone taper pack.  Patient will follow-up with her primary care physician within one week.  She will keep her scheduled appointment with Dr. Sang Marcos in 2 days.  She'll follow up with her cardiologist Dr. Sandoval at her already scheduled appointment in April.    Follow-up Appointments:   Future Appointments  Date Time Provider Department Center   5/1/2018 2:00 PM Sang Marcos MD [facility] None         This document has been electronically signed by BERNARDA Weiss on February 20, 2018 3:08 PM        Time: Greater than 30 minutes.

## 2018-05-01 ENCOUNTER — OFFICE VISIT (OUTPATIENT)
Dept: PULMONOLOGY | Facility: CLINIC | Age: 58
End: 2018-05-01

## 2018-05-01 VITALS
SYSTOLIC BLOOD PRESSURE: 119 MMHG | HEART RATE: 69 BPM | WEIGHT: 111.4 LBS | HEIGHT: 62 IN | OXYGEN SATURATION: 93 % | BODY MASS INDEX: 20.5 KG/M2 | DIASTOLIC BLOOD PRESSURE: 76 MMHG

## 2018-05-01 DIAGNOSIS — Z72.0 TOBACCO ABUSE: ICD-10-CM

## 2018-05-01 DIAGNOSIS — J42 CHRONIC BRONCHITIS, UNSPECIFIED CHRONIC BRONCHITIS TYPE (HCC): Primary | ICD-10-CM

## 2018-05-01 DIAGNOSIS — R09.02 HYPOXEMIA: ICD-10-CM

## 2018-05-01 PROCEDURE — 99213 OFFICE O/P EST LOW 20 MIN: CPT | Performed by: INTERNAL MEDICINE

## 2018-05-01 RX ORDER — HYDROCODONE BITARTRATE AND ACETAMINOPHEN 5; 325 MG/1; MG/1
TABLET ORAL
Refills: 0 | Status: ON HOLD | COMMUNITY
Start: 2018-04-25 | End: 2018-07-18

## 2018-05-01 RX ORDER — ALBUTEROL SULFATE 90 UG/1
AEROSOL, METERED RESPIRATORY (INHALATION)
Qty: 1 INHALER | Refills: 5 | Status: SHIPPED | OUTPATIENT
Start: 2018-05-01 | End: 2018-12-27 | Stop reason: SDUPTHER

## 2018-06-07 ENCOUNTER — HOSPITAL ENCOUNTER (OUTPATIENT)
Dept: ULTRASOUND IMAGING | Facility: HOSPITAL | Age: 58
Discharge: HOME | End: 2018-06-07
Admitting: NURSE PRACTITIONER

## 2018-06-07 DIAGNOSIS — E04.1 THYROID NODULE: ICD-10-CM

## 2018-06-07 PROCEDURE — 88173 CYTOPATH EVAL FNA REPORT: CPT | Performed by: PATHOLOGY

## 2018-06-07 PROCEDURE — 76942 ECHO GUIDE FOR BIOPSY: CPT

## 2018-06-07 PROCEDURE — 88173 CYTOPATH EVAL FNA REPORT: CPT | Performed by: NURSE PRACTITIONER

## 2018-06-11 LAB
LAB AP CASE REPORT: NORMAL
LAB AP DIAGNOSIS COMMENT: NORMAL
LAB AP NON-GYN SPECIMEN ADEQUACY: NORMAL
Lab: NORMAL
PATH REPORT.FINAL DX SPEC: NORMAL

## 2018-07-16 ENCOUNTER — HOSPITAL ENCOUNTER (INPATIENT)
Facility: HOSPITAL | Age: 58
LOS: 3 days | Discharge: HOME HEALTH | End: 2018-07-20
Attending: FAMILY MEDICINE | Admitting: FAMILY MEDICINE

## 2018-07-16 ENCOUNTER — APPOINTMENT (OUTPATIENT)
Dept: CT IMAGING | Facility: HOSPITAL | Age: 58
End: 2018-07-16

## 2018-07-16 DIAGNOSIS — N12 PYELONEPHRITIS: ICD-10-CM

## 2018-07-16 DIAGNOSIS — N13.39 OTHER HYDRONEPHROSIS: ICD-10-CM

## 2018-07-16 DIAGNOSIS — N23 RENAL COLIC ON RIGHT SIDE: Primary | ICD-10-CM

## 2018-07-16 DIAGNOSIS — N13.30 HYDRONEPHROSIS, UNSPECIFIED HYDRONEPHROSIS TYPE: ICD-10-CM

## 2018-07-16 LAB
ALBUMIN SERPL-MCNC: 3.6 G/DL (ref 3.4–4.8)
ALBUMIN/GLOB SERPL: 1.4 G/DL (ref 1.1–1.8)
ALP SERPL-CCNC: 57 U/L (ref 38–126)
ALT SERPL W P-5'-P-CCNC: 27 U/L (ref 9–52)
AMYLASE SERPL-CCNC: 80 U/L (ref 50–130)
ANION GAP SERPL CALCULATED.3IONS-SCNC: 4 MMOL/L (ref 5–15)
AST SERPL-CCNC: 38 U/L (ref 14–36)
BACTERIA UR QL AUTO: ABNORMAL /HPF
BASOPHILS # BLD AUTO: 0.01 10*3/MM3 (ref 0–0.2)
BASOPHILS NFR BLD AUTO: 0.1 % (ref 0–2)
BILIRUB SERPL-MCNC: 0.3 MG/DL (ref 0.2–1.3)
BILIRUB UR QL STRIP: NEGATIVE
BUN BLD-MCNC: 26 MG/DL (ref 7–21)
BUN/CREAT SERPL: 28.9 (ref 7–25)
CALCIUM SPEC-SCNC: 8.8 MG/DL (ref 8.4–10.2)
CHLORIDE SERPL-SCNC: 107 MMOL/L (ref 95–110)
CK SERPL-CCNC: 21 U/L (ref 30–135)
CLARITY UR: ABNORMAL
CO2 SERPL-SCNC: 29 MMOL/L (ref 22–31)
COLOR UR: YELLOW
CREAT BLD-MCNC: 0.9 MG/DL (ref 0.5–1)
DEPRECATED RDW RBC AUTO: 48.1 FL (ref 36.4–46.3)
EOSINOPHIL # BLD AUTO: 0.03 10*3/MM3 (ref 0–0.7)
EOSINOPHIL NFR BLD AUTO: 0.2 % (ref 0–7)
ERYTHROCYTE [DISTWIDTH] IN BLOOD BY AUTOMATED COUNT: 14 % (ref 11.5–14.5)
GFR SERPL CREATININE-BSD FRML MDRD: 64 ML/MIN/1.73 (ref 45–104)
GLOBULIN UR ELPH-MCNC: 2.6 GM/DL (ref 2.3–3.5)
GLUCOSE BLD-MCNC: 117 MG/DL (ref 60–100)
GLUCOSE UR STRIP-MCNC: NEGATIVE MG/DL
HCT VFR BLD AUTO: 36.7 % (ref 35–45)
HGB BLD-MCNC: 12.5 G/DL (ref 12–15.5)
HGB UR QL STRIP.AUTO: ABNORMAL
HOLD SPECIMEN: NORMAL
HYALINE CASTS UR QL AUTO: ABNORMAL /LPF
KETONES UR QL STRIP: ABNORMAL
LEUKOCYTE ESTERASE UR QL STRIP.AUTO: ABNORMAL
LIPASE SERPL-CCNC: 77 U/L (ref 23–300)
LYMPHOCYTES # BLD AUTO: 2.19 10*3/MM3 (ref 0.6–4.2)
LYMPHOCYTES # BLD MANUAL: 1.18 10*3/MM3 (ref 0.6–4.2)
LYMPHOCYTES NFR BLD AUTO: 14.8 % (ref 10–50)
LYMPHOCYTES NFR BLD MANUAL: 4 % (ref 0–12)
LYMPHOCYTES NFR BLD MANUAL: 8 % (ref 10–50)
MCH RBC QN AUTO: 31.7 PG (ref 26.5–34)
MCHC RBC AUTO-ENTMCNC: 34.1 G/DL (ref 31.4–36)
MCV RBC AUTO: 93.1 FL (ref 80–98)
MONOCYTES # BLD AUTO: 0.45 10*3/MM3 (ref 0–0.9)
MONOCYTES # BLD AUTO: 0.59 10*3/MM3 (ref 0–0.9)
MONOCYTES NFR BLD AUTO: 3 % (ref 0–12)
NEUTROPHILS # BLD AUTO: 12.07 10*3/MM3 (ref 2–8.6)
NEUTROPHILS # BLD AUTO: 13.02 10*3/MM3 (ref 2–8.6)
NEUTROPHILS NFR BLD AUTO: 81.6 % (ref 37–80)
NEUTROPHILS NFR BLD MANUAL: 85 % (ref 37–80)
NEUTS BAND NFR BLD MANUAL: 3 % (ref 0–5)
NITRITE UR QL STRIP: NEGATIVE
PH UR STRIP.AUTO: 5.5 [PH] (ref 5–9)
PLAT MORPH BLD: NORMAL
PLATELET # BLD AUTO: 230 10*3/MM3 (ref 150–450)
PMV BLD AUTO: 9.3 FL (ref 8–12)
POTASSIUM BLD-SCNC: 4 MMOL/L (ref 3.5–5.1)
PROT SERPL-MCNC: 6.2 G/DL (ref 6.3–8.6)
PROT UR QL STRIP: ABNORMAL
RBC # BLD AUTO: 3.94 10*6/MM3 (ref 3.77–5.16)
RBC # UR: ABNORMAL /HPF
RBC MORPH BLD: NORMAL
REF LAB TEST METHOD: ABNORMAL
SODIUM BLD-SCNC: 140 MMOL/L (ref 137–145)
SP GR UR STRIP: 1.02 (ref 1–1.03)
SQUAMOUS #/AREA URNS HPF: ABNORMAL /HPF
UROBILINOGEN UR QL STRIP: ABNORMAL
WBC MORPH BLD: NORMAL
WBC NRBC COR # BLD: 14.79 10*3/MM3 (ref 3.2–9.8)
WBC UR QL AUTO: ABNORMAL /HPF
WHOLE BLOOD HOLD SPECIMEN: NORMAL

## 2018-07-16 PROCEDURE — 25010000002 MORPHINE PER 10 MG: Performed by: FAMILY MEDICINE

## 2018-07-16 PROCEDURE — 74176 CT ABD & PELVIS W/O CONTRAST: CPT

## 2018-07-16 PROCEDURE — 80053 COMPREHEN METABOLIC PANEL: CPT | Performed by: FAMILY MEDICINE

## 2018-07-16 PROCEDURE — 82150 ASSAY OF AMYLASE: CPT | Performed by: FAMILY MEDICINE

## 2018-07-16 PROCEDURE — 25010000002 ONDANSETRON PER 1 MG: Performed by: FAMILY MEDICINE

## 2018-07-16 PROCEDURE — 82550 ASSAY OF CK (CPK): CPT | Performed by: FAMILY MEDICINE

## 2018-07-16 PROCEDURE — 85007 BL SMEAR W/DIFF WBC COUNT: CPT | Performed by: FAMILY MEDICINE

## 2018-07-16 PROCEDURE — 81001 URINALYSIS AUTO W/SCOPE: CPT | Performed by: FAMILY MEDICINE

## 2018-07-16 PROCEDURE — 36415 COLL VENOUS BLD VENIPUNCTURE: CPT

## 2018-07-16 PROCEDURE — 85025 COMPLETE CBC W/AUTO DIFF WBC: CPT | Performed by: FAMILY MEDICINE

## 2018-07-16 PROCEDURE — P9612 CATHETERIZE FOR URINE SPEC: HCPCS

## 2018-07-16 PROCEDURE — 83690 ASSAY OF LIPASE: CPT | Performed by: FAMILY MEDICINE

## 2018-07-16 PROCEDURE — 99285 EMERGENCY DEPT VISIT HI MDM: CPT

## 2018-07-16 RX ORDER — FLUOXETINE 10 MG/1
10 CAPSULE ORAL DAILY
COMMUNITY
End: 2018-07-20 | Stop reason: HOSPADM

## 2018-07-16 RX ORDER — ONDANSETRON 2 MG/ML
4 INJECTION INTRAMUSCULAR; INTRAVENOUS ONCE
Status: COMPLETED | OUTPATIENT
Start: 2018-07-16 | End: 2018-07-16

## 2018-07-16 RX ORDER — METAXALONE 800 MG/1
800 TABLET ORAL 4 TIMES DAILY
COMMUNITY
End: 2018-12-27

## 2018-07-16 RX ADMIN — SODIUM CHLORIDE 1000 ML: 9 INJECTION, SOLUTION INTRAVENOUS at 21:12

## 2018-07-16 RX ADMIN — ONDANSETRON 4 MG: 2 INJECTION INTRAMUSCULAR; INTRAVENOUS at 21:09

## 2018-07-16 RX ADMIN — MORPHINE SULFATE 4 MG: 4 INJECTION INTRAVENOUS at 21:10

## 2018-07-17 ENCOUNTER — APPOINTMENT (OUTPATIENT)
Dept: ULTRASOUND IMAGING | Facility: HOSPITAL | Age: 58
End: 2018-07-17

## 2018-07-17 PROBLEM — J96.11 CHRONIC RESPIRATORY FAILURE WITH HYPOXIA (HCC): Chronic | Status: ACTIVE | Noted: 2018-07-17

## 2018-07-17 PROBLEM — N23 RENAL COLIC ON RIGHT SIDE: Status: ACTIVE | Noted: 2018-07-17

## 2018-07-17 PROBLEM — N13.30 HYDRONEPHROSIS: Status: ACTIVE | Noted: 2018-07-17

## 2018-07-17 LAB
ANION GAP SERPL CALCULATED.3IONS-SCNC: 6 MMOL/L (ref 5–15)
BACTERIA UR QL AUTO: ABNORMAL /HPF
BASOPHILS # BLD AUTO: 0.01 10*3/MM3 (ref 0–0.2)
BASOPHILS NFR BLD AUTO: 0.1 % (ref 0–2)
BILIRUB UR QL STRIP: NEGATIVE
BUN BLD-MCNC: 23 MG/DL (ref 7–21)
BUN/CREAT SERPL: 29.1 (ref 7–25)
CALCIUM SPEC-SCNC: 9.1 MG/DL (ref 8.4–10.2)
CHLORIDE SERPL-SCNC: 107 MMOL/L (ref 95–110)
CLARITY UR: ABNORMAL
CO2 SERPL-SCNC: 29 MMOL/L (ref 22–31)
COLOR UR: YELLOW
CREAT BLD-MCNC: 0.79 MG/DL (ref 0.5–1)
DEPRECATED RDW RBC AUTO: 48.9 FL (ref 36.4–46.3)
EOSINOPHIL # BLD AUTO: 0.02 10*3/MM3 (ref 0–0.7)
EOSINOPHIL NFR BLD AUTO: 0.2 % (ref 0–7)
ERYTHROCYTE [DISTWIDTH] IN BLOOD BY AUTOMATED COUNT: 14.1 % (ref 11.5–14.5)
GFR SERPL CREATININE-BSD FRML MDRD: 74 ML/MIN/1.73 (ref 45–104)
GLUCOSE BLD-MCNC: 129 MG/DL (ref 60–100)
GLUCOSE UR STRIP-MCNC: NEGATIVE MG/DL
HCT VFR BLD AUTO: 37.6 % (ref 35–45)
HGB BLD-MCNC: 12.5 G/DL (ref 12–15.5)
HGB UR QL STRIP.AUTO: ABNORMAL
HYALINE CASTS UR QL AUTO: ABNORMAL /LPF
IMM GRANULOCYTES # BLD: 0.03 10*3/MM3 (ref 0–0.02)
IMM GRANULOCYTES NFR BLD: 0.3 % (ref 0–0.5)
KETONES UR QL STRIP: NEGATIVE
LEUKOCYTE ESTERASE UR QL STRIP.AUTO: NEGATIVE
LYMPHOCYTES # BLD AUTO: 2.02 10*3/MM3 (ref 0.6–4.2)
LYMPHOCYTES NFR BLD AUTO: 17.9 % (ref 10–50)
MCH RBC QN AUTO: 31.4 PG (ref 26.5–34)
MCHC RBC AUTO-ENTMCNC: 33.2 G/DL (ref 31.4–36)
MCV RBC AUTO: 94.5 FL (ref 80–98)
MONOCYTES # BLD AUTO: 1.35 10*3/MM3 (ref 0–0.9)
MONOCYTES NFR BLD AUTO: 12 % (ref 0–12)
NEUTROPHILS # BLD AUTO: 7.84 10*3/MM3 (ref 2–8.6)
NEUTROPHILS NFR BLD AUTO: 69.5 % (ref 37–80)
NITRITE UR QL STRIP: NEGATIVE
PH UR STRIP.AUTO: <=5 [PH] (ref 5–9)
PLATELET # BLD AUTO: 262 10*3/MM3 (ref 150–450)
PMV BLD AUTO: 9.5 FL (ref 8–12)
POTASSIUM BLD-SCNC: 4.5 MMOL/L (ref 3.5–5.1)
PROT UR QL STRIP: NEGATIVE
RBC # BLD AUTO: 3.98 10*6/MM3 (ref 3.77–5.16)
RBC # UR: ABNORMAL /HPF
REF LAB TEST METHOD: ABNORMAL
SODIUM BLD-SCNC: 142 MMOL/L (ref 137–145)
SP GR UR STRIP: 1.01 (ref 1–1.03)
SQUAMOUS #/AREA URNS HPF: ABNORMAL /HPF
UROBILINOGEN UR QL STRIP: ABNORMAL
WBC NRBC COR # BLD: 11.27 10*3/MM3 (ref 3.2–9.8)
WBC UR QL AUTO: ABNORMAL /HPF

## 2018-07-17 PROCEDURE — 87040 BLOOD CULTURE FOR BACTERIA: CPT | Performed by: INTERNAL MEDICINE

## 2018-07-17 PROCEDURE — 25010000002 CEFTRIAXONE PER 250 MG: Performed by: FAMILY MEDICINE

## 2018-07-17 PROCEDURE — 94799 UNLISTED PULMONARY SVC/PX: CPT

## 2018-07-17 PROCEDURE — 94640 AIRWAY INHALATION TREATMENT: CPT

## 2018-07-17 PROCEDURE — 80048 BASIC METABOLIC PNL TOTAL CA: CPT | Performed by: FAMILY MEDICINE

## 2018-07-17 PROCEDURE — 81001 URINALYSIS AUTO W/SCOPE: CPT | Performed by: INTERNAL MEDICINE

## 2018-07-17 PROCEDURE — 76775 US EXAM ABDO BACK WALL LIM: CPT

## 2018-07-17 PROCEDURE — 85025 COMPLETE CBC W/AUTO DIFF WBC: CPT | Performed by: FAMILY MEDICINE

## 2018-07-17 PROCEDURE — 94760 N-INVAS EAR/PLS OXIMETRY 1: CPT

## 2018-07-17 RX ORDER — RAMIPRIL 1.25 MG/1
1.25 CAPSULE ORAL
Status: DISCONTINUED | OUTPATIENT
Start: 2018-07-17 | End: 2018-07-19

## 2018-07-17 RX ORDER — CLOPIDOGREL BISULFATE 75 MG/1
75 TABLET ORAL DAILY
Status: DISCONTINUED | OUTPATIENT
Start: 2018-07-17 | End: 2018-07-20 | Stop reason: HOSPADM

## 2018-07-17 RX ORDER — SODIUM CHLORIDE 0.9 % (FLUSH) 0.9 %
1-10 SYRINGE (ML) INJECTION AS NEEDED
Status: DISCONTINUED | OUTPATIENT
Start: 2018-07-17 | End: 2018-07-20 | Stop reason: HOSPADM

## 2018-07-17 RX ORDER — BUDESONIDE AND FORMOTEROL FUMARATE DIHYDRATE 160; 4.5 UG/1; UG/1
2 AEROSOL RESPIRATORY (INHALATION)
Status: DISCONTINUED | OUTPATIENT
Start: 2018-07-17 | End: 2018-07-20 | Stop reason: HOSPADM

## 2018-07-17 RX ORDER — HYDROCODONE BITARTRATE AND ACETAMINOPHEN 5; 325 MG/1; MG/1
1 TABLET ORAL EVERY 6 HOURS PRN
Status: DISCONTINUED | OUTPATIENT
Start: 2018-07-17 | End: 2018-07-20 | Stop reason: HOSPADM

## 2018-07-17 RX ORDER — ONDANSETRON 2 MG/ML
4 INJECTION INTRAMUSCULAR; INTRAVENOUS EVERY 6 HOURS PRN
Status: DISCONTINUED | OUTPATIENT
Start: 2018-07-17 | End: 2018-07-20 | Stop reason: HOSPADM

## 2018-07-17 RX ORDER — ESCITALOPRAM OXALATE 10 MG/1
10 TABLET ORAL EVERY MORNING
Status: DISCONTINUED | OUTPATIENT
Start: 2018-07-17 | End: 2018-07-20 | Stop reason: HOSPADM

## 2018-07-17 RX ORDER — ALBUTEROL SULFATE 2.5 MG/3ML
2.5 SOLUTION RESPIRATORY (INHALATION) EVERY 6 HOURS PRN
Status: DISCONTINUED | OUTPATIENT
Start: 2018-07-17 | End: 2018-07-20 | Stop reason: HOSPADM

## 2018-07-17 RX ORDER — SODIUM CHLORIDE 9 MG/ML
125 INJECTION, SOLUTION INTRAVENOUS CONTINUOUS
Status: DISCONTINUED | OUTPATIENT
Start: 2018-07-17 | End: 2018-07-20 | Stop reason: HOSPADM

## 2018-07-17 RX ORDER — ASPIRIN 81 MG/1
81 TABLET ORAL DAILY
Status: DISCONTINUED | OUTPATIENT
Start: 2018-07-17 | End: 2018-07-20 | Stop reason: HOSPADM

## 2018-07-17 RX ADMIN — ESCITALOPRAM OXALATE 10 MG: 10 TABLET ORAL at 06:05

## 2018-07-17 RX ADMIN — CLOPIDOGREL BISULFATE 75 MG: 75 TABLET ORAL at 08:23

## 2018-07-17 RX ADMIN — BUDESONIDE AND FORMOTEROL FUMARATE DIHYDRATE 2 PUFF: 160; 4.5 AEROSOL RESPIRATORY (INHALATION) at 09:26

## 2018-07-17 RX ADMIN — ASPIRIN 81 MG: 81 TABLET, COATED ORAL at 08:23

## 2018-07-17 RX ADMIN — SODIUM CHLORIDE 125 ML/HR: 9 INJECTION, SOLUTION INTRAVENOUS at 21:31

## 2018-07-17 RX ADMIN — HYDROCODONE BITARTRATE AND ACETAMINOPHEN 1 TABLET: 5; 325 TABLET ORAL at 21:30

## 2018-07-17 RX ADMIN — CEFTRIAXONE SODIUM 1 G: 1 INJECTION, POWDER, FOR SOLUTION INTRAMUSCULAR; INTRAVENOUS at 00:54

## 2018-07-17 RX ADMIN — HYDROCODONE BITARTRATE AND ACETAMINOPHEN 1 TABLET: 5; 325 TABLET ORAL at 15:01

## 2018-07-17 RX ADMIN — RAMIPRIL 1.25 MG: 1.25 CAPSULE ORAL at 08:23

## 2018-07-17 RX ADMIN — SODIUM CHLORIDE 125 ML/HR: 9 INJECTION, SOLUTION INTRAVENOUS at 03:14

## 2018-07-17 NOTE — PLAN OF CARE
Problem: Patient Care Overview  Goal: Plan of Care Review  Outcome: Ongoing (interventions implemented as appropriate)   07/17/18 0444   Coping/Psychosocial   Plan of Care Reviewed With patient   Plan of Care Review   Progress no change   OTHER   Outcome Summary New Pt, Pt states pain controlled      Goal: Discharge Needs Assessment  Outcome: Ongoing (interventions implemented as appropriate)      Problem: Pain, Acute (Adult)  Goal: Identify Related Risk Factors and Signs and Symptoms  Outcome: Ongoing (interventions implemented as appropriate)   07/17/18 0444   Pain, Acute (Adult)   Related Risk Factors (Acute Pain) disease process     Goal: Acceptable Pain Control/Comfort Level  Outcome: Ongoing (interventions implemented as appropriate)      Problem: Urine Elimination Impaired (Adult)  Goal: Identify Related Risk Factors and Signs and Symptoms  Outcome: Ongoing (interventions implemented as appropriate)    Goal: Effective Containment of Urine  Outcome: Ongoing (interventions implemented as appropriate)    Goal: Reduced Incontinence Episodes  Outcome: Ongoing (interventions implemented as appropriate)

## 2018-07-17 NOTE — H&P
HISTORY AND PHYSICAL  NAME: Prabha Gonzalez  : 1960  MRN: [id number]    DATE OF ADMISSION: 18    DATE & TIME SEEN: 18 12:19 AM    PCP: BERNARDA Quijano    CODE STATUS:   Code Status and Medical Interventions:   Ordered at: 18 0152     Level Of Support Discussed With:    Patient     Code Status:    CPR     Medical Interventions (Level of Support Prior to Arrest):    Full       CHIEF COMPLAINT Abdominal pain    HPI:  Prabha Gonzalez is a 58 y.o. female who presents with abdominal pain.     Patient presented to ED with complaints of right flank pain that radiated to her abdomen and down towards her groin. She was nauseated and vomited. Patient denies any fever, new or worsening chills, diarrhea, shortness of breath (aside from her baseline), or chest pain.    Patient has a history of passing a kidney stone.      At time of exam her pain has improved, she is not currently nauseated, and she is not vomiting.    CONCURRENT MEDICAL HISTORY:  Past Medical History:   Diagnosis Date   • COPD (chronic obstructive pulmonary disease) (CMS/HCC)    • Emphysema of lung (CMS/HCC)    • SOB (shortness of breath)    • Tobacco use    • Vaginal bleeding        PAST SURGICAL HISTORY:  Past Surgical History:   Procedure Laterality Date   • HYSTERECTOMY      Ovary Preserv, age 16   • US GUIDED FINE NEEDLE ASPIRATION  2018       FAMILY HISTORY:  Family History   Problem Relation Age of Onset   • Family history unknown: Yes        SOCIAL HISTORY:  Social History     Social History   • Marital status: Single     Spouse name: N/A   • Number of children: N/A   • Years of education: N/A     Occupational History   • Not on file.     Social History Main Topics   • Smoking status: Current Every Day Smoker     Packs/day: 0.50     Years: 48.00     Types: Cigarettes   • Smokeless tobacco: Never Used   • Alcohol use No   • Drug use: No   • Sexual activity: Defer     Other Topics Concern   • Not on file     Social  History Narrative   • No narrative on file       HOME MEDICATIONS:  Prior to Admission medications    Medication Sig Start Date End Date Taking? Authorizing Provider   albuterol (VENTOLIN HFA) 108 (90 Base) MCG/ACT inhaler 2 puffs every 4 hours as needed for breathing 8/22/17  Yes Sang Marcos MD   albuterol (VENTOLIN HFA) 108 (90 Base) MCG/ACT inhaler 2 puffs every 4 hours as needed for breathing 5/1/18  Yes Sang Marcos MD   aspirin 81 MG EC tablet Take 81 mg by mouth Daily.   Yes Historical Provider, MD   atenolol (TENORMIN) 25 MG tablet Take 25 mg by mouth Daily.   Yes Historical Provider, MD   budesonide-formoterol (SYMBICORT) 160-4.5 MCG/ACT inhaler 2 puffs twice a day 8/22/17  Yes Sang Marcos MD   calcium carbonate-cholecalciferol 500-400 MG-UNIT tablet tablet Take  by mouth 2 (Two) Times a Day.   Yes Historical Provider, MD   clopidogrel (PLAVIX) 75 MG tablet Take 75 mg by mouth Daily. 1/11/18  Yes Historical Provider, MD   cycloSPORINE (RESTASIS) 0.05 % ophthalmic emulsion Administer 1 drop to both eyes 2 (Two) Times a Day.   Yes Historical Provider, MD   FLUoxetine (PROzac) 10 MG capsule Take 10 mg by mouth Daily.   Yes Historical Provider, MD   HYDROcodone-acetaminophen (NORCO) 5-325 MG per tablet  4/25/18  Yes Historical Provider, MD   hydrOXYzine (VISTARIL) 50 MG capsule Take 50 mg by mouth 2 (Two) Times a Day. 7/3/17  Yes Historical Provider, MD   ipratropium-albuterol (DUO-NEB) 0.5-2.5 mg/mL nebulizer USE 1 VIAL IN NEBULIZER FOUR TIMES DAILY 12/6/17  Yes Sang Marcos MD   metaxalone (SKELAXIN) 800 MG tablet Take 800 mg by mouth 4 (Four) Times a Day.   Yes Historical Provider, MD   nitroglycerin (NITROSTAT) 0.4 MG SL tablet Place 0.4 mg under the tongue Every 5 (Five) Minutes As Needed for Chest Pain. Take no more than 3 doses in 15 minutes.   Yes Historical Provider, MD   Omega-3 Fatty Acids (OMEGA 3 PO) Take  by mouth.   Yes Historical Provider, MD   OMEPRAZOLE PO Take 20 mg by mouth  Daily As Needed (stomach acid).   Yes Historical Provider, MD   Pediatric Multiple Vitamins (THERA MULTI-VITAMIN PO) Take 1 tablet by mouth Daily.   Yes Historical Provider, MD   ramipril (ALTACE) 1.25 MG capsule Take 1.25 mg by mouth. 1/11/18  Yes Historical Provider, MD   rosuvastatin (CRESTOR) 10 MG tablet Take 10 mg by mouth Every Night.   Yes Historical Provider, MD   SYMBICORT 80-4.5 MCG/ACT inhaler Inhale 2 puffs 2 (Two) Times a Day. 6/16/17  Yes Historical Provider, MD   escitalopram (LEXAPRO) 10 MG tablet Take 10 mg by mouth Every Morning.    Historical Provider, MD   PredniSONE (DELTASONE) 10 MG (21) tablet pack Use as directed on package 2/20/18   BERNARDA Weiss       ALLERGIES:  Amitriptyline; Codeine; Nortriptyline; Tricyclic antidepressants; and Tylenol [acetaminophen]    REVIEW OF SYSTEMS  Review of Systems   Constitutional: Positive for activity change, chills and fatigue. Negative for appetite change and fever.   HENT: Negative for ear pain and sore throat.    Eyes: Negative for pain and visual disturbance.   Respiratory: Negative for cough and shortness of breath.    Cardiovascular: Negative for chest pain and palpitations.   Gastrointestinal: Positive for nausea and vomiting. Negative for abdominal pain, constipation and diarrhea.   Endocrine: Negative for cold intolerance and heat intolerance.   Genitourinary: Negative for difficulty urinating and dysuria.   Musculoskeletal: Negative for arthralgias and gait problem.   Skin: Negative for color change and rash.   Neurological: Negative for dizziness, weakness and headaches.   Hematological: Negative for adenopathy. Does not bruise/bleed easily.   Psychiatric/Behavioral: Negative for agitation, confusion and sleep disturbance.       PHYSICAL EXAM:  Temp:  [97.2 °F (36.2 °C)] 97.2 °F (36.2 °C)  Heart Rate:  [56-58] 56  Resp:  [20] 20  BP: (133-183)/(63-76) 133/63  Body mass index is 20.37 kg/m².     Physical Exam   Constitutional: She is  oriented to person, place, and time. She appears well-developed and well-nourished. No distress.   HENT:   Head: Normocephalic and atraumatic.   Right Ear: External ear normal.   Left Ear: External ear normal.   Nose: Nose normal.   Eyes: Conjunctivae and EOM are normal. Pupils are equal, round, and reactive to light.   Neck: Normal range of motion. Neck supple.   Cardiovascular: Normal rate, regular rhythm, normal heart sounds and intact distal pulses.    Pulmonary/Chest: Effort normal. No respiratory distress. She has wheezes. She has no rales. She exhibits no tenderness.   Abdominal: Soft. Bowel sounds are normal. She exhibits no distension and no mass. There is tenderness. There is CVA tenderness (Right flank). There is no rebound, no guarding, no tenderness at McBurney's point and negative Landers's sign.   Musculoskeletal: Normal range of motion. She exhibits no edema.   Neurological: She is alert and oriented to person, place, and time.   Skin: Skin is warm and dry. No rash noted. She is not diaphoretic. No erythema. No pallor.   Psychiatric: She has a normal mood and affect. Her behavior is normal.   Nursing note and vitals reviewed.      DIAGNOSTIC DATA:   Lab Results (last 24 hours)     Procedure Component Value Units Date/Time    Extra Tubes [[id number]] Collected:  07/16/18 2133    Specimen:  Blood from Blood, Venous Line Updated:  07/16/18 2245    Narrative:       The following orders were created for panel order Extra Tubes.  Procedure                               Abnormality         Status                     ---------                               -----------         ------                     Light Blue Top[[id number]]                                   Final result               Gold Top - SST[[id number]]                                   Final result                 Please view results for these tests on the individual orders.    Light Blue Top [[id number]] Collected:  07/16/18 2133    Specimen:  Blood  Updated:  07/16/18 2245     Extra Tube hold for add-on     Comment: Auto resulted       Gold Top - SST [[id number]] Collected:  07/16/18 2133    Specimen:  Blood Updated:  07/16/18 2245     Extra Tube Hold for add-ons.     Comment: Auto resulted.       CBC & Differential [[id number]] Collected:  07/16/18 2133    Specimen:  Blood Updated:  07/16/18 2202    Narrative:       The following orders were created for panel order CBC & Differential.  Procedure                               Abnormality         Status                     ---------                               -----------         ------                     Manual Differential[[id number]]          Abnormal            Final result               Scan Slide[[id number]]                                                                  CBC Auto Differential[[id number]]        Abnormal            Edited Result - FINAL        Please view results for these tests on the individual orders.    Manual Differential [[id number]]  (Abnormal) Collected:  07/16/18 2133    Specimen:  Blood Updated:  07/16/18 2202     Neutrophil % 85.0 (H) %      Lymphocyte % 8.0 (L) %      Monocyte % 4.0 %      Bands %  3.0 %      Neutrophils Absolute 13.02 (H) 10*3/mm3      Lymphocytes Absolute 1.18 10*3/mm3      Monocytes Absolute 0.59 10*3/mm3      RBC Morphology Normal     WBC Morphology Normal     Platelet Morphology Normal    Urinalysis, Microscopic Only - Urine, Clean Catch [[id number]]  (Abnormal) Collected:  07/16/18 2150    Specimen:  Urine from Urine, Catheter Updated:  07/16/18 2159     RBC, UA 31-50 (A) /HPF      WBC, UA 3-5 /HPF      Bacteria, UA None Seen /HPF      Squamous Epithelial Cells, UA 13-20 (A) /HPF      Hyaline Casts, UA 3-6 /LPF      Methodology Automated Microscopy    Urinalysis With Microscopic If Indicated (No Culture) - Urine, Catheter [[id number]]  (Abnormal) Collected:  07/16/18 2150    Specimen:  Urine from Urine, Catheter Updated:  07/16/18 2158     Color, UA Yellow      Appearance, UA Cloudy (A)     pH, UA 5.5     Specific Gravity, UA 1.022     Glucose, UA Negative     Ketones, UA 15 mg/dL (1+) (A)     Bilirubin, UA Negative     Blood, UA Large (3+) (A)     Protein, UA 30 mg/dL (1+) (A)     Leuk Esterase, UA Trace (A)     Nitrite, UA Negative     Urobilinogen, UA 0.2 E.U./dL    Lipase [[id number]]  (Normal) Collected:  07/16/18 2133    Specimen:  Blood Updated:  07/16/18 2150     Lipase 77 U/L     Comprehensive Metabolic Panel [[id number]]  (Abnormal) Collected:  07/16/18 2133    Specimen:  Blood Updated:  07/16/18 2150     Glucose 117 (H) mg/dL      BUN 26 (H) mg/dL      Creatinine 0.90 mg/dL      Sodium 140 mmol/L      Potassium 4.0 mmol/L      Chloride 107 mmol/L      CO2 29.0 mmol/L      Calcium 8.8 mg/dL      Total Protein 6.2 (L) g/dL      Albumin 3.60 g/dL      ALT (SGPT) 27 U/L      AST (SGOT) 38 (H) U/L      Alkaline Phosphatase 57 U/L      Total Bilirubin 0.3 mg/dL      eGFR Non African Amer 64 mL/min/1.73      Globulin 2.6 gm/dL      A/G Ratio 1.4 g/dL      BUN/Creatinine Ratio 28.9 (H)     Anion Gap 4.0 (L) mmol/L     Amylase [[id number]]  (Normal) Collected:  07/16/18 2133    Specimen:  Blood Updated:  07/16/18 2150     Amylase 80 U/L     CK [[id number]]  (Abnormal) Collected:  07/16/18 2133    Specimen:  Blood Updated:  07/16/18 2150     Creatine Kinase 21 (L) U/L     CBC Auto Differential [[id number]]  (Abnormal) Collected:  07/16/18 2133    Specimen:  Blood Updated:  07/16/18 2141     WBC 14.79 (H) 10*3/mm3      RBC 3.94 10*6/mm3      Hemoglobin 12.5 g/dL      Hematocrit 36.7 %      MCV 93.1 fL      MCH 31.7 pg      MCHC 34.1 g/dL      RDW 14.0 %      RDW-SD 48.1 (H) fl      MPV 9.3 fL      Platelets 230 10*3/mm3      Neutrophil % 81.6 (H) %      Lymphocyte % 14.8 %      Monocyte % 3.0 %      Eosinophil % 0.2 %      Basophil % 0.1 %      Neutrophils, Absolute 12.07 (H) 10*3/mm3      Lymphocytes, Absolute 2.19 10*3/mm3      Monocytes, Absolute 0.45 10*3/mm3       Eosinophils, Absolute 0.03 10*3/mm3      Basophils, Absolute 0.01 10*3/mm3     Narrative:       The previously reported component Immature Gran % is no longer being reported.  The previously reported component Absolute Immature Gran is no longer being reported.        Estimated Creatinine Clearance: 54.8 mL/min (by C-G formula based on SCr of 0.9 mg/dL).     Imaging Results (last 24 hours)     Procedure Component Value Units Date/Time    CT Abdomen Pelvis Without Contrast [[id number]] Collected:  07/16/18 2224     Updated:  07/16/18 2253    Narrative:         CT abdomen and pelvis without contrast on 7/16/2018     CLINICAL INDICATION: Right-sided back pain, right lower quadrant  pain    TECHNIQUE: Multiple axial images are obtained throughout the  abdomen and pelvis without the administration of contrast. This  exam was performed according to our departmental  dose-optimization program, which includes automated exposure  control, adjustment of the mA and/or kV according to patient size  and/or use of iterative reconstruction technique.   Total DLP is 279.9 mGy*cm.    COMPARISON: 2/10/2015    FINDINGS:  Abdomen: There is mild linear atelectasis or scarring in the left  lung base. Emphysematous changes are noted in the lung bases.  There is moderate right hydronephrosis with an enlarged  extrarenal pelvis with abrupt transition to normal caliber  proximal right ureter. The appearance is suggestive of a right  UPJ stenosis/obstruction. There is right perinephric stranding  that may be related to infection. No ureteral stone is  visualized. The findings also could be related to recently passed  right-sided stone. Recommend urology consultation and correlation  with the patient's urinalysis. There are couple of calcifications  in the left kidney that may be dystrophic versus nonobstructing  left renal stones, favor dystrophic calcifications. The  unenhanced solid abdominal organs are otherwise unremarkable.  Vascular  calcifications are noted. There is no abdominal  adenopathy. There is no free fluid or free air within the  abdomen. The abdominal portion of the GI tract is unremarkable.    Pelvis: The patient is status post hysterectomy. There is no free  fluid in the pelvis. There is no pelvic adenopathy. There is  diverticulosis. The appendix is not visualized but no pericecal  inflammatory changes are noted. The pelvic portion of the GI  tract is otherwise unremarkable. Degenerative changes are noted  in the spine. Old left pelvic fractures are noted. No acute bony  abnormality is noted. There is mild levoscoliosis of the spine.      Impression:       1. Moderate right hydronephrosis with dilated right renal pelvis  with abrupt transition to normal caliber right proximal ureter  suggesting a right UPJ stenosis/obstruction. There is also right  perinephric stranding. As above recommend urology consultation  and correlation with the patient's urinalysis.  2. Diverticulosis.    Electronically signed by:  Osvaldo Benítez  7/16/2018 10:52 PM  CDT Workstation: [id number]          I reviewed the patient's new clinical results.    ASSESSMENT AND PLAN: This is a 58 y.o. female with:    Active Hospital Problems    Diagnosis Date Noted   • **Renal colic on right side [N23] 07/17/2018   • Hydronephrosis [N13.30] 07/17/2018   • Chronic respiratory failure with hypoxia (CMS/[facility]) [J96.11] 07/17/2018   • COPD (chronic obstructive pulmonary disease) (CMS/[facility]) [J44.9] 08/19/2016   • HLD (hyperlipidemia) [E78.5] 01/12/2015   • HTN (hypertension) [I10] 10/30/2013       #. Abdominal pain. Renal colic. Possible UTI. Rocephin. Hydronephrosis. CT scan concern for stenosis/obstruction right UPJ.  #. Chronic respiratory failure. At baseline. 2 lpm nasal cannula. Continue oxygen.  #. Hydronephrosis. Unknown cause. No stone on CT. Renal ultrasound ordered.   #. COPD. Not in acute exacerbation. Monitor.  #. HTN. Ramipril  #. Acute cystitis.  Suspected. Rocephin. UA reviewed.  #. Tobacco abuse. Nicotine patch. Cessation counseling.    Will monitor patient's hospital course and adjust treatment as hospital course dictates.    DVT prophylaxis: SCDs  Code status is   Code Status and Medical Interventions:   Ordered at: 07/17/18 0152     Level Of Support Discussed With:    Patient     Code Status:    CPR     Medical Interventions (Level of Support Prior to Arrest):    Full      SAWYER # [id number], reviewed and consistent with patient reported medications.      I discussed the patients findings and my recommendations with patient and nursing staff.           This document has been electronically signed by Tristan Acosta MD on July 17, 2018 12:19 AM

## 2018-07-17 NOTE — ED PROVIDER NOTES
Subjective     Abdominal Pain   Pain location:  RUQ  Pain quality: aching    Pain radiates to:  R leg and RLQ  Pain severity:  Moderate  Onset quality:  Sudden  Duration:  1 day  Timing:  Constant  Progression:  Worsening  Chronicity:  New  Context: not alcohol use, not suspicious food intake and not trauma    Relieved by:  Nothing  Worsened by:  Nothing  Ineffective treatments:  None tried  Associated symptoms: chills, fatigue, nausea and vomiting    Associated symptoms: no chest pain, no constipation, no cough, no diarrhea, no dysuria, no fever, no hematuria, no shortness of breath, no sore throat, no vaginal bleeding and no vaginal discharge    Risk factors: no alcohol abuse, not elderly and not obese    Flank Pain   Associated symptoms: chills, fatigue, nausea and vomiting    Associated symptoms: no chest pain, no constipation, no cough, no diarrhea, no dysuria, no fever, no hematuria, no shortness of breath, no sore throat, no vaginal bleeding and no vaginal discharge    Nausea   The primary symptoms include fatigue, abdominal pain, nausea and vomiting. Primary symptoms do not include fever, diarrhea, dysuria, myalgias or rash.   The illness is also significant for chills. The illness does not include constipation.   Vomiting   The primary symptoms include fatigue, abdominal pain, nausea and vomiting. Primary symptoms do not include fever, diarrhea, dysuria, myalgias or rash.   The illness is also significant for chills. The illness does not include constipation.       Review of Systems   Constitutional: Positive for chills and fatigue. Negative for appetite change, diaphoresis and fever.   HENT: Negative for congestion, ear discharge, ear pain, nosebleeds, rhinorrhea, sinus pressure, sore throat and trouble swallowing.    Eyes: Negative for discharge and redness.   Respiratory: Negative for apnea, cough, chest tightness, shortness of breath and wheezing.    Cardiovascular: Negative for chest pain.    Gastrointestinal: Positive for abdominal pain, nausea and vomiting. Negative for constipation and diarrhea.   Endocrine: Negative for polyuria.   Genitourinary: Positive for flank pain. Negative for dysuria, frequency, hematuria, urgency, vaginal bleeding and vaginal discharge.   Musculoskeletal: Negative for myalgias and neck pain.   Skin: Negative for color change and rash.   Allergic/Immunologic: Negative for immunocompromised state.   Neurological: Negative for dizziness, seizures, syncope, weakness, light-headedness and headaches.   Hematological: Negative for adenopathy. Does not bruise/bleed easily.   Psychiatric/Behavioral: Negative for behavioral problems and confusion.   All other systems reviewed and are negative.      Past Medical History:   Diagnosis Date   • COPD (chronic obstructive pulmonary disease) (CMS/[facility])    • Emphysema of lung (CMS/[facility])    • SOB (shortness of breath)    • Tobacco use    • Vaginal bleeding        Allergies   Allergen Reactions   • Amitriptyline    • Codeine    • Nortriptyline    • Tricyclic Antidepressants    • Tylenol [Acetaminophen] Nausea Only       Past Surgical History:   Procedure Laterality Date   • HYSTERECTOMY      Ovary Preserv, age 16   • US GUIDED FINE NEEDLE ASPIRATION  6/7/2018       Family History   Problem Relation Age of Onset   • Family history unknown: Yes       Social History     Social History   • Marital status: Single     Social History Main Topics   • Smoking status: Current Every Day Smoker     Packs/day: 0.50     Years: 48.00     Types: Cigarettes   • Smokeless tobacco: Never Used   • Alcohol use No   • Drug use: No   • Sexual activity: Defer     Other Topics Concern   • Not on file           Objective   Physical Exam   Constitutional: She is oriented to person, place, and time. She appears well-developed and well-nourished.   HENT:   Head: Normocephalic and atraumatic.   Nose: Nose normal.   Mouth/Throat: Oropharynx is clear and moist.   Eyes:  Conjunctivae and EOM are normal. Pupils are equal, round, and reactive to light. Right eye exhibits no discharge. Left eye exhibits no discharge. No scleral icterus.   Neck: Normal range of motion. Neck supple. No tracheal deviation present.   Cardiovascular: Normal rate, regular rhythm and normal heart sounds.    No murmur heard.  Pulmonary/Chest: Effort normal and breath sounds normal. No stridor. No respiratory distress. She has no wheezes. She has no rales.   Abdominal: Soft. Bowel sounds are normal. She exhibits no distension and no mass. There is tenderness in the right upper quadrant. There is CVA tenderness (right). There is no rebound and no guarding.   Musculoskeletal: She exhibits no edema.   Neurological: She is alert and oriented to person, place, and time. Coordination normal.   Skin: Skin is warm and dry. No rash noted. No erythema.   Psychiatric: She has a normal mood and affect. Her behavior is normal. Thought content normal.   Nursing note and vitals reviewed.      Procedures           ED Course          Labs Reviewed   COMPREHENSIVE METABOLIC PANEL - Abnormal; Notable for the following:        Result Value    Glucose 117 (*)     BUN 26 (*)     Total Protein 6.2 (*)     AST (SGOT) 38 (*)     BUN/Creatinine Ratio 28.9 (*)     Anion Gap 4.0 (*)     All other components within normal limits   URINALYSIS W/ MICROSCOPIC IF INDICATED (NO CULTURE) - Abnormal; Notable for the following:     Appearance, UA Cloudy (*)     Ketones, UA 15 mg/dL (1+) (*)     Blood, UA Large (3+) (*)     Protein, UA 30 mg/dL (1+) (*)     Leuk Esterase, UA Trace (*)     All other components within normal limits   CK - Abnormal; Notable for the following:     Creatine Kinase 21 (*)     All other components within normal limits   CBC WITH AUTO DIFFERENTIAL - Abnormal; Notable for the following:     WBC 14.79 (*)     RDW-SD 48.1 (*)     Neutrophil % 81.6 (*)     Neutrophils, Absolute 12.07 (*)     All other components within normal  limits    Narrative:     The previously reported component Immature Gran % is no longer being reported.  The previously reported component Absolute Immature Gran is no longer being reported.   URINALYSIS, MICROSCOPIC ONLY - Abnormal; Notable for the following:     RBC, UA 31-50 (*)     Squamous Epithelial Cells, UA 13-20 (*)     All other components within normal limits   MANUAL DIFFERENTIAL - Abnormal; Notable for the following:     Neutrophil % 85.0 (*)     Lymphocyte % 8.0 (*)     Neutrophils Absolute 13.02 (*)     All other components within normal limits   AMYLASE - Normal   LIPASE - Normal   CBC AND DIFFERENTIAL    Narrative:     The following orders were created for panel order CBC & Differential.  Procedure                               Abnormality         Status                     ---------                               -----------         ------                     Manual Differential[[id number]]          Abnormal            Final result               Scan Slide[[id number]]                                                                  CBC Auto Differential[[id number]]        Abnormal            Edited Result - FINAL        Please view results for these tests on the individual orders.   EXTRA TUBES    Narrative:     The following orders were created for panel order Extra Tubes.  Procedure                               Abnormality         Status                     ---------                               -----------         ------                     Light Blue Top[[id number]]                                   Final result               Gold Top - SST[[id number]]                                   Final result                 Please view results for these tests on the individual orders.   LIGHT BLUE TOP   GOLD TOP - SST       CT Abdomen Pelvis Without Contrast   Final Result   1. Moderate right hydronephrosis with dilated right renal pelvis   with abrupt transition to normal caliber right proximal ureter    suggesting a right UPJ stenosis/obstruction. There is also right   perinephric stranding. As above recommend urology consultation   and correlation with the patient's urinalysis.   2. Diverticulosis.      Electronically signed by:  Osvaldo Benítez  7/16/2018 10:52 PM   CDT Workstation: [id number]                    MDM      Final diagnoses:   Renal colic on right side   Pyelonephritis   Other hydronephrosis            Jacky Garcia MD  07/17/18 0038

## 2018-07-17 NOTE — PROGRESS NOTES
Seen and examined again today, right sided PG stenoses/obstruction, consulted with urology.  Transfer to med/surg  Acute UTI possible pyelonephritis start IV Rocephin obtain urine and blood cultures

## 2018-07-18 ENCOUNTER — APPOINTMENT (OUTPATIENT)
Dept: GENERAL RADIOLOGY | Facility: HOSPITAL | Age: 58
End: 2018-07-18

## 2018-07-18 ENCOUNTER — ANESTHESIA EVENT (OUTPATIENT)
Dept: PERIOP | Facility: HOSPITAL | Age: 58
End: 2018-07-18

## 2018-07-18 ENCOUNTER — ANESTHESIA (OUTPATIENT)
Dept: PERIOP | Facility: HOSPITAL | Age: 58
End: 2018-07-18

## 2018-07-18 LAB
ANION GAP SERPL CALCULATED.3IONS-SCNC: 3 MMOL/L (ref 5–15)
BASOPHILS # BLD AUTO: 0.02 10*3/MM3 (ref 0–0.2)
BASOPHILS NFR BLD AUTO: 0.3 % (ref 0–2)
BILIRUB UR QL STRIP: NEGATIVE
BUN BLD-MCNC: 14 MG/DL (ref 7–21)
BUN/CREAT SERPL: 19.2 (ref 7–25)
CALCIUM SPEC-SCNC: 8.6 MG/DL (ref 8.4–10.2)
CHLORIDE SERPL-SCNC: 110 MMOL/L (ref 95–110)
CLARITY UR: CLEAR
CO2 SERPL-SCNC: 28 MMOL/L (ref 22–31)
COLOR UR: YELLOW
CREAT BLD-MCNC: 0.73 MG/DL (ref 0.5–1)
DEPRECATED RDW RBC AUTO: 48.8 FL (ref 36.4–46.3)
EOSINOPHIL # BLD AUTO: 0.12 10*3/MM3 (ref 0–0.7)
EOSINOPHIL NFR BLD AUTO: 1.6 % (ref 0–7)
ERYTHROCYTE [DISTWIDTH] IN BLOOD BY AUTOMATED COUNT: 14.2 % (ref 11.5–14.5)
GFR SERPL CREATININE-BSD FRML MDRD: 81 ML/MIN/1.73 (ref 45–104)
GLUCOSE BLD-MCNC: 84 MG/DL (ref 60–100)
GLUCOSE UR STRIP-MCNC: NEGATIVE MG/DL
HCT VFR BLD AUTO: 35.4 % (ref 35–45)
HGB BLD-MCNC: 11.9 G/DL (ref 12–15.5)
HGB UR QL STRIP.AUTO: NEGATIVE
IMM GRANULOCYTES # BLD: 0.02 10*3/MM3 (ref 0–0.02)
IMM GRANULOCYTES NFR BLD: 0.3 % (ref 0–0.5)
KETONES UR QL STRIP: NEGATIVE
LEUKOCYTE ESTERASE UR QL STRIP.AUTO: NEGATIVE
LYMPHOCYTES # BLD AUTO: 3.1 10*3/MM3 (ref 0.6–4.2)
LYMPHOCYTES NFR BLD AUTO: 40.6 % (ref 10–50)
MCH RBC QN AUTO: 31.6 PG (ref 26.5–34)
MCHC RBC AUTO-ENTMCNC: 33.6 G/DL (ref 31.4–36)
MCV RBC AUTO: 93.9 FL (ref 80–98)
MONOCYTES # BLD AUTO: 0.85 10*3/MM3 (ref 0–0.9)
MONOCYTES NFR BLD AUTO: 11.1 % (ref 0–12)
NEUTROPHILS # BLD AUTO: 3.52 10*3/MM3 (ref 2–8.6)
NEUTROPHILS NFR BLD AUTO: 46.1 % (ref 37–80)
NITRITE UR QL STRIP: NEGATIVE
PH UR STRIP.AUTO: <=5 [PH] (ref 5–9)
PLATELET # BLD AUTO: 238 10*3/MM3 (ref 150–450)
PMV BLD AUTO: 9.4 FL (ref 8–12)
POTASSIUM BLD-SCNC: 4.8 MMOL/L (ref 3.5–5.1)
PROT UR QL STRIP: NEGATIVE
RBC # BLD AUTO: 3.77 10*6/MM3 (ref 3.77–5.16)
SODIUM BLD-SCNC: 141 MMOL/L (ref 137–145)
SP GR UR STRIP: 1.01 (ref 1–1.03)
UROBILINOGEN UR QL STRIP: NORMAL
WBC NRBC COR # BLD: 7.63 10*3/MM3 (ref 3.2–9.8)

## 2018-07-18 PROCEDURE — 81003 URINALYSIS AUTO W/O SCOPE: CPT | Performed by: FAMILY MEDICINE

## 2018-07-18 PROCEDURE — 94760 N-INVAS EAR/PLS OXIMETRY 1: CPT

## 2018-07-18 PROCEDURE — 94799 UNLISTED PULMONARY SVC/PX: CPT

## 2018-07-18 PROCEDURE — C2617 STENT, NON-COR, TEM W/O DEL: HCPCS | Performed by: UROLOGY

## 2018-07-18 PROCEDURE — 25010000002 MORPHINE PER 10 MG: Performed by: FAMILY MEDICINE

## 2018-07-18 PROCEDURE — 74420 UROGRAPHY RTRGR +-KUB: CPT

## 2018-07-18 PROCEDURE — 25010000002 FENTANYL CITRATE (PF) 100 MCG/2ML SOLUTION: Performed by: NURSE ANESTHETIST, CERTIFIED REGISTERED

## 2018-07-18 PROCEDURE — 25010000002 IOPAMIDOL 61 % SOLUTION: Performed by: UROLOGY

## 2018-07-18 PROCEDURE — 25010000002 HYDROMORPHONE PER 4 MG: Performed by: ANESTHESIOLOGY

## 2018-07-18 PROCEDURE — BT1D1ZZ FLUOROSCOPY OF RIGHT KIDNEY, URETER AND BLADDER USING LOW OSMOLAR CONTRAST: ICD-10-PCS | Performed by: UROLOGY

## 2018-07-18 PROCEDURE — 25010000002 CEFTRIAXONE PER 250 MG: Performed by: FAMILY MEDICINE

## 2018-07-18 PROCEDURE — 25010000002 HYDRALAZINE PER 20 MG: Performed by: FAMILY MEDICINE

## 2018-07-18 PROCEDURE — C1758 CATHETER, URETERAL: HCPCS | Performed by: UROLOGY

## 2018-07-18 PROCEDURE — 0T768DZ DILATION OF RIGHT URETER WITH INTRALUMINAL DEVICE, VIA NATURAL OR ARTIFICIAL OPENING ENDOSCOPIC: ICD-10-PCS | Performed by: UROLOGY

## 2018-07-18 PROCEDURE — 25010000002 PROPOFOL 10 MG/ML EMULSION: Performed by: NURSE ANESTHETIST, CERTIFIED REGISTERED

## 2018-07-18 PROCEDURE — 85025 COMPLETE CBC W/AUTO DIFF WBC: CPT | Performed by: FAMILY MEDICINE

## 2018-07-18 PROCEDURE — C1769 GUIDE WIRE: HCPCS | Performed by: UROLOGY

## 2018-07-18 PROCEDURE — 80048 BASIC METABOLIC PNL TOTAL CA: CPT | Performed by: FAMILY MEDICINE

## 2018-07-18 DEVICE — URETERAL STENT
Type: IMPLANTABLE DEVICE | Site: URETER | Status: FUNCTIONAL
Brand: POLARIS™ ULTRA

## 2018-07-18 RX ORDER — METAXALONE 800 MG/1
800 TABLET ORAL 4 TIMES DAILY
Status: DISCONTINUED | OUTPATIENT
Start: 2018-07-18 | End: 2018-07-20 | Stop reason: HOSPADM

## 2018-07-18 RX ORDER — FLUMAZENIL 0.1 MG/ML
0.2 INJECTION INTRAVENOUS AS NEEDED
Status: DISCONTINUED | OUTPATIENT
Start: 2018-07-18 | End: 2018-07-18 | Stop reason: HOSPADM

## 2018-07-18 RX ORDER — ACETAMINOPHEN 325 MG/1
650 TABLET ORAL ONCE AS NEEDED
Status: DISCONTINUED | OUTPATIENT
Start: 2018-07-18 | End: 2018-07-18 | Stop reason: HOSPADM

## 2018-07-18 RX ORDER — BUDESONIDE AND FORMOTEROL FUMARATE DIHYDRATE 80; 4.5 UG/1; UG/1
2 AEROSOL RESPIRATORY (INHALATION) 2 TIMES DAILY
Status: DISCONTINUED | OUTPATIENT
Start: 2018-07-18 | End: 2018-07-18 | Stop reason: SDUPTHER

## 2018-07-18 RX ORDER — ATENOLOL 25 MG/1
25 TABLET ORAL DAILY
Status: DISCONTINUED | OUTPATIENT
Start: 2018-07-18 | End: 2018-07-20 | Stop reason: HOSPADM

## 2018-07-18 RX ORDER — MORPHINE SULFATE 2 MG/ML
1 INJECTION, SOLUTION INTRAMUSCULAR; INTRAVENOUS EVERY 6 HOURS PRN
Status: DISCONTINUED | OUTPATIENT
Start: 2018-07-18 | End: 2018-07-20 | Stop reason: HOSPADM

## 2018-07-18 RX ORDER — NITROGLYCERIN 0.4 MG/1
0.4 TABLET SUBLINGUAL
Status: DISCONTINUED | OUTPATIENT
Start: 2018-07-18 | End: 2018-07-20 | Stop reason: HOSPADM

## 2018-07-18 RX ORDER — MEPERIDINE HYDROCHLORIDE 50 MG/ML
12.5 INJECTION INTRAMUSCULAR; INTRAVENOUS; SUBCUTANEOUS
Status: DISCONTINUED | OUTPATIENT
Start: 2018-07-18 | End: 2018-07-18 | Stop reason: HOSPADM

## 2018-07-18 RX ORDER — LIDOCAINE HYDROCHLORIDE 20 MG/ML
INJECTION, SOLUTION INFILTRATION; PERINEURAL AS NEEDED
Status: DISCONTINUED | OUTPATIENT
Start: 2018-07-18 | End: 2018-07-18 | Stop reason: SURG

## 2018-07-18 RX ORDER — ONDANSETRON 2 MG/ML
4 INJECTION INTRAMUSCULAR; INTRAVENOUS ONCE AS NEEDED
Status: DISCONTINUED | OUTPATIENT
Start: 2018-07-18 | End: 2018-07-18 | Stop reason: HOSPADM

## 2018-07-18 RX ORDER — NALOXONE HCL 0.4 MG/ML
0.2 VIAL (ML) INJECTION AS NEEDED
Status: DISCONTINUED | OUTPATIENT
Start: 2018-07-18 | End: 2018-07-18 | Stop reason: HOSPADM

## 2018-07-18 RX ORDER — LABETALOL HYDROCHLORIDE 5 MG/ML
5 INJECTION, SOLUTION INTRAVENOUS
Status: DISCONTINUED | OUTPATIENT
Start: 2018-07-18 | End: 2018-07-18 | Stop reason: HOSPADM

## 2018-07-18 RX ORDER — HYDRALAZINE HYDROCHLORIDE 20 MG/ML
10 INJECTION INTRAMUSCULAR; INTRAVENOUS EVERY 6 HOURS PRN
Status: DISCONTINUED | OUTPATIENT
Start: 2018-07-18 | End: 2018-07-20 | Stop reason: HOSPADM

## 2018-07-18 RX ORDER — FENTANYL CITRATE 50 UG/ML
INJECTION, SOLUTION INTRAMUSCULAR; INTRAVENOUS AS NEEDED
Status: DISCONTINUED | OUTPATIENT
Start: 2018-07-18 | End: 2018-07-18 | Stop reason: SURG

## 2018-07-18 RX ORDER — ALBUTEROL SULFATE 2.5 MG/3ML
2.5 SOLUTION RESPIRATORY (INHALATION) EVERY 6 HOURS PRN
Status: DISCONTINUED | OUTPATIENT
Start: 2018-07-18 | End: 2018-07-20 | Stop reason: HOSPADM

## 2018-07-18 RX ORDER — DIPHENHYDRAMINE HYDROCHLORIDE 50 MG/ML
12.5 INJECTION INTRAMUSCULAR; INTRAVENOUS
Status: DISCONTINUED | OUTPATIENT
Start: 2018-07-18 | End: 2018-07-18 | Stop reason: HOSPADM

## 2018-07-18 RX ORDER — PROPOFOL 10 MG/ML
VIAL (ML) INTRAVENOUS AS NEEDED
Status: DISCONTINUED | OUTPATIENT
Start: 2018-07-18 | End: 2018-07-18 | Stop reason: SURG

## 2018-07-18 RX ORDER — ACETAMINOPHEN 650 MG/1
650 SUPPOSITORY RECTAL ONCE AS NEEDED
Status: DISCONTINUED | OUTPATIENT
Start: 2018-07-18 | End: 2018-07-18 | Stop reason: HOSPADM

## 2018-07-18 RX ORDER — FLUOXETINE 10 MG/1
10 CAPSULE ORAL DAILY
Status: DISCONTINUED | OUTPATIENT
Start: 2018-07-18 | End: 2018-07-18

## 2018-07-18 RX ORDER — EPHEDRINE SULFATE 50 MG/ML
5 INJECTION, SOLUTION INTRAVENOUS ONCE AS NEEDED
Status: DISCONTINUED | OUTPATIENT
Start: 2018-07-18 | End: 2018-07-18 | Stop reason: HOSPADM

## 2018-07-18 RX ADMIN — SODIUM CHLORIDE 125 ML/HR: 9 INJECTION, SOLUTION INTRAVENOUS at 06:33

## 2018-07-18 RX ADMIN — LIDOCAINE HYDROCHLORIDE 80 MG: 20 INJECTION, SOLUTION INFILTRATION; PERINEURAL at 18:58

## 2018-07-18 RX ADMIN — SODIUM CHLORIDE 125 ML/HR: 9 INJECTION, SOLUTION INTRAVENOUS at 14:41

## 2018-07-18 RX ADMIN — METAXALONE 800 MG: 800 TABLET ORAL at 21:01

## 2018-07-18 RX ADMIN — MORPHINE SULFATE 1 MG: 2 INJECTION, SOLUTION INTRAMUSCULAR; INTRAVENOUS at 20:59

## 2018-07-18 RX ADMIN — HYDROMORPHONE HYDROCHLORIDE 0.5 MG: 1 INJECTION, SOLUTION INTRAMUSCULAR; INTRAVENOUS; SUBCUTANEOUS at 19:44

## 2018-07-18 RX ADMIN — HYDROMORPHONE HYDROCHLORIDE 0.5 MG: 1 INJECTION, SOLUTION INTRAMUSCULAR; INTRAVENOUS; SUBCUTANEOUS at 20:14

## 2018-07-18 RX ADMIN — CEFTRIAXONE SODIUM 1 G: 1 INJECTION, POWDER, FOR SOLUTION INTRAMUSCULAR; INTRAVENOUS at 00:27

## 2018-07-18 RX ADMIN — PROPOFOL 60 MG: 10 INJECTION, EMULSION INTRAVENOUS at 19:02

## 2018-07-18 RX ADMIN — BUDESONIDE AND FORMOTEROL FUMARATE DIHYDRATE 2 PUFF: 160; 4.5 AEROSOL RESPIRATORY (INHALATION) at 07:22

## 2018-07-18 RX ADMIN — HYDROCODONE BITARTRATE AND ACETAMINOPHEN 1 TABLET: 5; 325 TABLET ORAL at 21:41

## 2018-07-18 RX ADMIN — ATENOLOL 25 MG: 25 TABLET ORAL at 13:05

## 2018-07-18 RX ADMIN — HYDRALAZINE HYDROCHLORIDE 10 MG: 20 INJECTION, SOLUTION INTRAMUSCULAR; INTRAVENOUS at 19:58

## 2018-07-18 RX ADMIN — PROPOFOL 110 MG: 10 INJECTION, EMULSION INTRAVENOUS at 18:58

## 2018-07-18 RX ADMIN — ESCITALOPRAM OXALATE 10 MG: 10 TABLET ORAL at 06:33

## 2018-07-18 RX ADMIN — RAMIPRIL 1.25 MG: 1.25 CAPSULE ORAL at 08:31

## 2018-07-18 RX ADMIN — FENTANYL CITRATE 25 MCG: 50 INJECTION, SOLUTION INTRAMUSCULAR; INTRAVENOUS at 19:04

## 2018-07-18 RX ADMIN — HYDROCODONE BITARTRATE AND ACETAMINOPHEN 1 TABLET: 5; 325 TABLET ORAL at 06:43

## 2018-07-18 RX ADMIN — MORPHINE SULFATE 1 MG: 2 INJECTION, SOLUTION INTRAMUSCULAR; INTRAVENOUS at 13:28

## 2018-07-18 NOTE — PLAN OF CARE
Problem: Patient Care Overview  Goal: Plan of Care Review  Outcome: Ongoing (interventions implemented as appropriate)   07/18/18 0640   Coping/Psychosocial   Plan of Care Reviewed With patient   Plan of Care Review   Progress no change     Goal: Individualization and Mutuality  Outcome: Ongoing (interventions implemented as appropriate)    Goal: Discharge Needs Assessment  Outcome: Ongoing (interventions implemented as appropriate)      Problem: Pain, Acute (Adult)  Goal: Identify Related Risk Factors and Signs and Symptoms  Outcome: Outcome(s) achieved Date Met: 07/18/18    Goal: Acceptable Pain Control/Comfort Level  Outcome: Ongoing (interventions implemented as appropriate)      Problem: Urine Elimination Impaired (Adult)  Goal: Identify Related Risk Factors and Signs and Symptoms  Outcome: Outcome(s) achieved Date Met: 07/18/18    Goal: Effective or Improved Urinary Elimination  Outcome: Ongoing (interventions implemented as appropriate)    Goal: Effective Containment of Urine  Outcome: Ongoing (interventions implemented as appropriate)    Goal: Reduced Incontinence Episodes  Outcome: Ongoing (interventions implemented as appropriate)

## 2018-07-18 NOTE — ANESTHESIA PREPROCEDURE EVALUATION
Anesthesia Evaluation     Patient summary reviewed   NPO Solid Status: > 8 hours  NPO Liquid Status: > 8 hours           Airway   Mallampati: I  TM distance: <3 FB  Neck ROM: full  No difficulty expected  Dental    (+) edentulous    Pulmonary - normal exam   (+) a smoker Current, COPD moderate, shortness of breath,   Cardiovascular - normal exam  Exercise tolerance: poor (<4 METS)    NYHA Classification: III    (+) hypertension, valvular problems/murmurs murmur, hyperlipidemia,       Neuro/Psych  (+) psychiatric history Anxiety,     GI/Hepatic/Renal/Endo    (+)  GERD well controlled,  renal disease stones,     Musculoskeletal     Abdominal    Substance History      OB/GYN          Other   (+) arthritis                     Anesthesia Plan    ASA 3     general     intravenous induction   Anesthetic plan and risks discussed with patient.

## 2018-07-18 NOTE — PROGRESS NOTES
LOS: 1 day     Patient Care Team:  BERNARDA Vallejo as PCP - General (Emergency Medicine)      Subjective     Right UPJ obstruction    Objective       Vital Signs  Temp:  [97 °F (36.1 °C)-98.3 °F (36.8 °C)] 97 °F (36.1 °C)  Heart Rate:  [53-71] 53  Resp:  [18] 18  BP: (150-188)/(64-86) 171/84    Physical Exam:        General Appearance:    No distress     Respiratory:    UNLABORED RESPIRATIONS.     Abdomen:     SOFT.       Genitourinary:   Right pyelonephritis with UPJ obstruction     Rectal:     DEFERRED       Results Review:       Imaging Results (last 24 hours)     ** No results found for the last 24 hours. **        Lab Results (last 24 hours)     Procedure Component Value Units Date/Time    Urinalysis With Culture If Indicated - Urine, Clean Catch [[id number]]  (Normal) Collected:  07/18/18 1516    Specimen:  Urine from Urine, Clean Catch Updated:  07/18/18 1530     Color, UA Yellow     Appearance, UA Clear     pH, UA <=5.0     Specific Gravity, UA 1.007     Glucose, UA Negative     Ketones, UA Negative     Bilirubin, UA Negative     Blood, UA Negative     Protein, UA Negative     Leuk Esterase, UA Negative     Nitrite, UA Negative     Urobilinogen, UA 0.2 E.U./dL    Narrative:       Urine microscopic not indicated.    Blood Culture - Blood, [[id number]]  (Normal) Collected:  07/17/18 1121    Specimen:  Blood from Arm, Right Updated:  07/18/18 1230     Blood Culture No growth at 24 hours    Blood Culture - Blood, [[id number]]  (Normal) Collected:  07/17/18 1121    Specimen:  Blood from Arm, Left Updated:  07/18/18 1230     Blood Culture No growth at 24 hours    Basic Metabolic Panel [[id number]]  (Abnormal) Collected:  07/18/18 0538    Specimen:  Blood Updated:  07/18/18 0650     Glucose 84 mg/dL      BUN 14 mg/dL      Creatinine 0.73 mg/dL      Sodium 141 mmol/L      Potassium 4.8 mmol/L      Chloride 110 mmol/L      CO2 28.0 mmol/L      Calcium 8.6 mg/dL      eGFR Non African Amer 81 mL/min/1.73       BUN/Creatinine Ratio 19.2     Anion Gap 3.0 (L) mmol/L     CBC & Differential [[id number]] Collected:  07/18/18 0539    Specimen:  Blood Updated:  07/18/18 0636    Narrative:       The following orders were created for panel order CBC & Differential.  Procedure                               Abnormality         Status                     ---------                               -----------         ------                     CBC Auto Differential[[id number]]        Abnormal            Final result                 Please view results for these tests on the individual orders.    CBC Auto Differential [[id number]]  (Abnormal) Collected:  07/18/18 0539    Specimen:  Blood Updated:  07/18/18 0636     WBC 7.63 10*3/mm3      RBC 3.77 10*6/mm3      Hemoglobin 11.9 (L) g/dL      Hematocrit 35.4 %      MCV 93.9 fL      MCH 31.6 pg      MCHC 33.6 g/dL      RDW 14.2 %      RDW-SD 48.8 (H) fl      MPV 9.4 fL      Platelets 238 10*3/mm3      Neutrophil % 46.1 %      Lymphocyte % 40.6 %      Monocyte % 11.1 %      Eosinophil % 1.6 %      Basophil % 0.3 %      Immature Grans % 0.3 %      Neutrophils, Absolute 3.52 10*3/mm3      Lymphocytes, Absolute 3.10 10*3/mm3      Monocytes, Absolute 0.85 10*3/mm3      Eosinophils, Absolute 0.12 10*3/mm3      Basophils, Absolute 0.02 10*3/mm3      Immature Grans, Absolute 0.02 10*3/mm3             I reviewed the patient's new clinical results.  I reviewed the patient's new imaging results and agree with the interpretation.  I reviewed the patient's other test results and agree with the interpretation        Assessment/Plan     Principal Problem:    Renal colic on right side  Active Problems:    COPD (chronic obstructive pulmonary disease) (CMS/HCC)    HLD (hyperlipidemia)    HTN (hypertension)    Hydronephrosis    Chronic respiratory failure with hypoxia (CMS/HCC)      Plan for cystoscopy right retrograde double-J stent placement risk and benefits have been discussed with the patient and family they  wished to proceed      Jorge Negro MD  07/18/18  5:46 PM

## 2018-07-18 NOTE — CONSULTS
Inpatient Urology Consult  Consult performed by: MIKE LYNN  Consult ordered by: XIOMARA CORONADO          Patient Care Team:  BERNARDA Vallejo as PCP - General (Emergency Medicine)    Chief complaint: UPJ stenosis and pyelo    Subjective     Ms. Gonzalez is a 58-year-old female with a 1 day history of right flank pain, constant and moderate pain sharp in nature associated with nausea, vomiting, decreased urine output, she confirms a  history of right UPJ stenosis that imaging suggests along with pyelo, states she does follow with Urology in [place], no stent placement in past, has recurrent UTIs, she is a tobacco user, diagnosed with COPD. She denies hematuria, difficulty urinating, but reports chills, fatigue and feeling generally unwell the last few days. Family present and supportive.       Flank Pain   Pertinent negatives include no chest pain, fever, pelvic pain or weakness.   Nausea   Associated symptoms include chills, fatigue, nausea and vomiting. Pertinent negatives include no chest pain, coughing, diaphoresis, fever, rash or weakness.   Vomiting    Associated symptoms include chills. Pertinent negatives include no chest pain, coughing, dizziness or fever.       Review of Systems   Constitutional: Positive for activity change, appetite change, chills and fatigue. Negative for diaphoresis and fever.   HENT: Negative.    Eyes: Negative.    Respiratory: Negative.  Negative for apnea, cough, choking and wheezing.    Cardiovascular: Negative.  Negative for chest pain, palpitations and leg swelling.   Gastrointestinal: Negative for abdominal distention, anal bleeding, blood in stool and rectal pain.   Endocrine: Negative.    Genitourinary: Positive for flank pain. Negative for decreased urine volume, difficulty urinating, pelvic pain, vaginal bleeding, vaginal discharge and vaginal pain.   Musculoskeletal: Negative for back pain and gait problem.   Skin: Negative.  Negative for color change, pallor, rash  and wound.   Allergic/Immunologic: Negative.    Neurological: Negative.  Negative for dizziness, syncope and weakness.   Hematological: Negative.  Negative for adenopathy. Does not bruise/bleed easily.   Psychiatric/Behavioral: Negative.  Negative for agitation, behavioral problems, confusion and self-injury.        Past Medical History:   Diagnosis Date   • COPD (chronic obstructive pulmonary disease) (CMS/[facility])    • Emphysema of lung (CMS/[facility])    • SOB (shortness of breath)    • Tobacco use    • Vaginal bleeding    ,   Past Surgical History:   Procedure Laterality Date   • HYSTERECTOMY      Ovary Preserv, age 16   • US GUIDED FINE NEEDLE ASPIRATION  6/7/2018   ,   Family History   Problem Relation Age of Onset   • Family history unknown: Yes   ,   Social History   Substance Use Topics   • Smoking status: Current Every Day Smoker     Packs/day: 0.50     Years: 48.00     Types: Cigarettes   • Smokeless tobacco: Never Used   • Alcohol use No   ,   Prescriptions Prior to Admission   Medication Sig Dispense Refill Last Dose   • albuterol (VENTOLIN HFA) 108 (90 Base) MCG/ACT inhaler 2 puffs every 4 hours as needed for breathing 1 inhaler 5 Taking   • albuterol (VENTOLIN HFA) 108 (90 Base) MCG/ACT inhaler 2 puffs every 4 hours as needed for breathing 1 inhaler 5    • aspirin 81 MG EC tablet Take 81 mg by mouth Daily.   Taking   • atenolol (TENORMIN) 25 MG tablet Take 25 mg by mouth Daily.   Taking   • budesonide-formoterol (SYMBICORT) 160-4.5 MCG/ACT inhaler 2 puffs twice a day 1 inhaler 5 Taking   • calcium carbonate-cholecalciferol 500-400 MG-UNIT tablet tablet Take  by mouth 2 (Two) Times a Day.      • clopidogrel (PLAVIX) 75 MG tablet Take 75 mg by mouth Daily.   Taking   • cycloSPORINE (RESTASIS) 0.05 % ophthalmic emulsion Administer 1 drop to both eyes 2 (Two) Times a Day.   Not Taking   • escitalopram (LEXAPRO) 10 MG tablet Take 10 mg by mouth Every Morning.   7/16/2018   • FLUoxetine (PROzac) 10 MG capsule Take 10  mg by mouth Daily.      • ipratropium-albuterol (DUO-NEB) 0.5-2.5 mg/mL nebulizer USE 1 VIAL IN NEBULIZER FOUR TIMES DAILY 360 mL 11 Taking   • metaxalone (SKELAXIN) 800 MG tablet Take 800 mg by mouth 4 (Four) Times a Day.      • nitroglycerin (NITROSTAT) 0.4 MG SL tablet Place 0.4 mg under the tongue Every 5 (Five) Minutes As Needed for Chest Pain. Take no more than 3 doses in 15 minutes.   Taking   • Omega-3 Fatty Acids (OMEGA 3 PO) Take  by mouth.   Taking   • Pediatric Multiple Vitamins (THERA MULTI-VITAMIN PO) Take 1 tablet by mouth Daily.   Taking   • ramipril (ALTACE) 1.25 MG capsule Take 1.25 mg by mouth.   Taking   • SYMBICORT 80-4.5 MCG/ACT inhaler Inhale 2 puffs 2 (Two) Times a Day.  0 Not Taking   • HYDROcodone-acetaminophen (NORCO) 5-325 MG per tablet   0 Taking   • hydrOXYzine (VISTARIL) 50 MG capsule Take 50 mg by mouth 2 (Two) Times a Day.  3 Taking   • OMEPRAZOLE PO Take 20 mg by mouth Daily As Needed (stomach acid).   Taking   • PredniSONE (DELTASONE) 10 MG (21) tablet pack Use as directed on package 21 tablet 0 Not Taking     ALLERGIES:  Amitriptyline; Codeine; Nortriptyline; Tricyclic antidepressants; and Tylenol [acetaminophen]    Objective      Vital Signs  Temp:  [96.8 °F (36 °C)-98.3 °F (36.8 °C)] 98.3 °F (36.8 °C)  Heart Rate:  [57-71] 71  Resp:  [16-20] 18  BP: (104-158)/(51-73) 158/73    Physical Exam   Constitutional: She is oriented to person, place, and time. She appears well-developed and well-nourished. No distress.   HENT:   Head: Normocephalic and atraumatic.   Eyes: EOM are normal. Pupils are equal, round, and reactive to light. Right eye exhibits no discharge. Left eye exhibits no discharge.   Cardiovascular: Normal rate, regular rhythm, normal heart sounds and intact distal pulses.  Exam reveals no gallop and no friction rub.    No murmur heard.  Pulmonary/Chest: Effort normal and breath sounds normal. No respiratory distress. She has no wheezes. She has no rales. She exhibits no  tenderness.   Abdominal: Soft. Bowel sounds are normal. She exhibits no distension and no mass. There is tenderness. There is no guarding.   Musculoskeletal: Normal range of motion. She exhibits no edema, tenderness or deformity.   Neurological: She is alert and oriented to person, place, and time.   Skin: Skin is warm and dry. Capillary refill takes less than 2 seconds. No rash noted. She is not diaphoretic. No cyanosis or erythema. There is pallor. Nails show no clubbing.   Psychiatric: She has a normal mood and affect. Her speech is normal and behavior is normal. Judgment and thought content normal. Cognition and memory are normal. Cognition and memory are not impaired. She is attentive.       Results Review:   Lab Results (last 24 hours)     Procedure Component Value Units Date/Time    Blood Culture - Blood, [[id number]] Collected:  07/17/18 1121    Specimen:  Blood from Arm, Right Updated:  07/17/18 1218    Blood Culture - Blood, [[id number]] Collected:  07/17/18 1121    Specimen:  Blood from Arm, Left Updated:  07/17/18 1217    Urinalysis, Microscopic Only - Urine, Clean Catch [[id number]]  (Abnormal) Collected:  07/17/18 1057    Specimen:  Urine from Urine, Clean Catch Updated:  07/17/18 1132     RBC, UA Too Numerous to Count (A) /HPF      WBC, UA 3-5 /HPF      Bacteria, UA Trace (A) /HPF      Squamous Epithelial Cells, UA 6-12 (A) /HPF      Hyaline Casts, UA 0-2 /LPF      Methodology Manual Light Microscopy    Urinalysis With Culture If Indicated - Urine, Clean Catch [[id number]]  (Abnormal) Collected:  07/17/18 1057    Specimen:  Urine from Urine, Clean Catch Updated:  07/17/18 1119     Color, UA Yellow     Appearance, UA Cloudy (A)     pH, UA <=5.0     Specific Gravity, UA 1.010     Glucose, UA Negative     Ketones, UA Negative     Bilirubin, UA Negative     Blood, UA Large (3+) (A)     Protein, UA Negative     Leuk Esterase, UA Negative     Nitrite, UA Negative     Urobilinogen, UA 0.2 E.U./dL    Basic  Metabolic Panel [[id number]]  (Abnormal) Collected:  07/17/18 0534    Specimen:  Blood Updated:  07/17/18 0608     Glucose 129 (H) mg/dL      BUN 23 (H) mg/dL      Creatinine 0.79 mg/dL      Sodium 142 mmol/L      Potassium 4.5 mmol/L      Chloride 107 mmol/L      CO2 29.0 mmol/L      Calcium 9.1 mg/dL      eGFR Non African Amer 74 mL/min/1.73      BUN/Creatinine Ratio 29.1 (H)     Anion Gap 6.0 mmol/L     CBC & Differential [[id number]] Collected:  07/17/18 0534    Specimen:  Blood Updated:  07/17/18 0546    Narrative:       The following orders were created for panel order CBC & Differential.  Procedure                               Abnormality         Status                     ---------                               -----------         ------                     CBC Auto Differential[[id number]]        Abnormal            Final result                 Please view results for these tests on the individual orders.    CBC Auto Differential [[id number]]  (Abnormal) Collected:  07/17/18 0534    Specimen:  Blood Updated:  07/17/18 0546     WBC 11.27 (H) 10*3/mm3      RBC 3.98 10*6/mm3      Hemoglobin 12.5 g/dL      Hematocrit 37.6 %      MCV 94.5 fL      MCH 31.4 pg      MCHC 33.2 g/dL      RDW 14.1 %      RDW-SD 48.9 (H) fl      MPV 9.5 fL      Platelets 262 10*3/mm3      Neutrophil % 69.5 %      Lymphocyte % 17.9 %      Monocyte % 12.0 %      Eosinophil % 0.2 %      Basophil % 0.1 %      Immature Grans % 0.3 %      Neutrophils, Absolute 7.84 10*3/mm3      Lymphocytes, Absolute 2.02 10*3/mm3      Monocytes, Absolute 1.35 (H) 10*3/mm3      Eosinophils, Absolute 0.02 10*3/mm3      Basophils, Absolute 0.01 10*3/mm3      Immature Grans, Absolute 0.03 (H) 10*3/mm3          Imaging Results (last 24 hours)     Procedure Component Value Units Date/Time    US Renal Bilateral [[id number]] Collected:  07/17/18 0830     Updated:  07/17/18 1518    Narrative:       Ultrasound renal complete    HISTORY:  Hydronephrosis. Renal colic.  Tubulo- interstitial  nephritis.    Ultrasound examination of the kidneys and urinary bladder was  performed.    COMPARISON: None. Correlation CT July 16, 2018    The right kidney measures 11.12 cm in length by 6.78 cm x 6.46 cm  transverse.  The left kidney measures 9.31 cm in length by 5.72 cm x 4.87 cm  transverse.  Echogenicity of the kidneys is somewhat increased, suggesting  medical renal disease.  Minimal to moderate right hydronephrosis similar to earlier CT  and again suggesting UPJ obstruction.  No solid or cystic renal mass.    Images of the urinary bladder are unremarkable.      Impression:       CONCLUSION:  Echogenicity of the kidneys is somewhat increased, suggesting  medical renal disease.  Minimal to moderate right hydronephrosis similar to earlier CT  and again suggesting UPJ obstruction.    [id number]    Electronically signed by:  Yahir Lynn MD  7/17/2018 3:17 PM CDT  Workstation: [employer]           I reviewed the patient's new clinical results.  I reviewed the patient's new imaging results and agree with the interpretation.  I reviewed the patient's other test results and agree with the interpretation        Assessment/Plan     Principal Problem:    Renal colic on right side  Active Problems:    COPD (chronic obstructive pulmonary disease) (CMS/HCC)    HLD (hyperlipidemia)    HTN (hypertension)    Hydronephrosis    Chronic respiratory failure with hypoxia (CMS/[facility])      Assessment & Plan   Consulted to Urology for   #1 and #2. Mild to moderate right hydronephrosis related to UPJ obstruction with pyelonephritis, history of UTI  -14.79-->11.27 WBC, normotensive, afebrile  -+ flank pain, nausea and vomiting, decrease urine output--> symptoms improving since admission  -UA +, Urine culture and blood cultures pending  -Rocephin day #1  -Estimated Creatinine Clearance: 59.7 mL/min (by C-G formula based on SCr of 0.79 mg/dL).  -CT abdomen/pelvis without contrast 7/16/18 shows a moderate right  hydronephrosis with an enlarged  extrarenal pelvis and an abrupt transition to normal caliber proximal right ureter which suggests right  UPJ stenosis/obstruction; also, RIGHT perinephric stranding.    Plan: NPO after breakfast for OR with Dr. Negro. Agree with Rocephin for UTI pyelonephritis, cultures pending. Pain control.     Thank you, Dr. Catherine Lagos, for consulting [facility] and allowing us to participate in Ms. Gonzalez's care.     I discussed the patient's findings and my recommendations with patient, family, nursing staff, primary care team and Dr. Sruthi Sim, BERNARDA  07/18/18  12:00 AM

## 2018-07-18 NOTE — PROGRESS NOTES
FAMILY MEDICINE DAILY PROGRESS NOTE  NAME: Prabha Gonzalez  : 1960  MRN: [id number]     LOS: 1 day     PROVIDER OF SERVICE: Ryan Barbosa MD    Chief Complaint: Renal colic on right side    Subjective:     Interval History:  History taken from: patient  Ms. Gonzalez was seen this morning comfortable in bed. Her vital signs were stable overnight except for an elevate BP of 160/86. She was started on Atenolol 25 mg qd and will monitor. Hydralazine as a prn. Her wbc are trending down.      Review of Systems:   Review of Systems   Constitutional: Negative for activity change, appetite change and fever.   HENT: Negative for rhinorrhea, sneezing and sore throat.    Respiratory: Negative for cough, chest tightness, shortness of breath and wheezing.    Cardiovascular: Negative for chest pain and leg swelling.   Gastrointestinal: Negative for abdominal distention and abdominal pain.   Genitourinary: Positive for decreased urine volume and difficulty urinating.   Musculoskeletal: Negative for arthralgias and myalgias.   Skin: Negative for color change and pallor.   Neurological: Negative for dizziness and light-headedness.   Psychiatric/Behavioral: Negative for behavioral problems. The patient is nervous/anxious.        Objective:     Vital Signs  Temp:  [97.3 °F (36.3 °C)-98.3 °F (36.8 °C)] 97.5 °F (36.4 °C)  Heart Rate:  [53-71] 66  Resp:  [16-18] 18  BP: (104-188)/(51-86) 160/86    Physical Exam  Physical Exam   Constitutional: She is oriented to person, place, and time. She appears well-developed and well-nourished.   HENT:   Head: Normocephalic and atraumatic.   Right Ear: External ear normal.   Left Ear: External ear normal.   Eyes: Pupils are equal, round, and reactive to light. EOM are normal.   Neck: Normal range of motion. Neck supple.   Cardiovascular: Normal rate, regular rhythm and normal heart sounds.  Exam reveals no gallop and no friction rub.    No murmur heard.  Pulmonary/Chest: Effort normal and  breath sounds normal. No respiratory distress. She has no wheezes. She has no rales.   Abdominal: Soft. Bowel sounds are normal. She exhibits no distension. There is tenderness.   Musculoskeletal: Normal range of motion. She exhibits no edema.   Neurological: She is alert and oriented to person, place, and time.   Skin: Skin is warm. No erythema.   Psychiatric: Her behavior is normal. Her mood appears anxious.       Medication Review    Current Facility-Administered Medications:   •  albuterol (PROVENTIL) nebulizer solution 0.083% 2.5 mg/3mL, 2.5 mg, Nebulization, Q6H PRN, Tristan Acosta MD  •  albuterol (PROVENTIL) nebulizer solution 0.083% 2.5 mg/3mL, 2.5 mg, Nebulization, Q6H PRN, Tung Ledesma MD  •  aspirin EC tablet 81 mg, 81 mg, Oral, Daily, Tristan Acosta MD, 81 mg at 07/17/18 0823  •  atenolol (TENORMIN) tablet 25 mg, 25 mg, Oral, Daily, Tung Ledesma MD  •  budesonide-formoterol (SYMBICORT) 160-4.5 MCG/ACT inhaler 2 puff, 2 puff, Inhalation, BID - RT, Tristan Acosta MD, 2 puff at 07/18/18 0722  •  budesonide-formoterol (SYMBICORT) 80-4.5 MCG/ACT inhaler 2 puff, 2 puff, Inhalation, BID, Tung Ledesma MD  •  cefTRIAXone (ROCEPHIN) 1 g/100 mL 0.9% NS (MBP), 1 g, Intravenous, Q24H, Tristan Acosta MD, 1 g at 07/18/18 0027  •  clopidogrel (PLAVIX) tablet 75 mg, 75 mg, Oral, Daily, Tristan Acosta MD, 75 mg at 07/17/18 0823  •  escitalopram (LEXAPRO) tablet 10 mg, 10 mg, Oral, QAM, Tristan Acosta MD, 10 mg at 07/18/18 0633  •  FLUoxetine (PROzac) capsule 10 mg, 10 mg, Oral, Daily, Tung Ledesma MD  •  hydrALAZINE (APRESOLINE) injection 10 mg, 10 mg, Intravenous, Q6H PRN, Tung Ledesma MD  •  HYDROcodone-acetaminophen (NORCO) 5-325 MG per tablet 1 tablet, 1 tablet, Oral, Q6H PRN, Jorge Negro MD, 1 tablet at 07/18/18 0643  •  metaxalone (SKELAXIN) tablet 800 mg, 800 mg, Oral, 4x Daily, Tung Ledesma MD  •  nitroglycerin (NITROSTAT) SL tablet 0.4 mg, 0.4 mg, Sublingual, Q5 Min PRN, Tung Ledesma MD  •  ondansetron  (ZOFRAN) injection 4 mg, 4 mg, Intravenous, Q6H PRN, Tristan Acosta MD  •  ramipril (ALTACE) capsule 1.25 mg, 1.25 mg, Oral, Q24H, Tristan Acosta MD, 1.25 mg at 07/18/18 0831  •  sodium chloride 0.9 % flush 1-10 mL, 1-10 mL, Intravenous, PRN, Tristan Acosta MD  •  sodium chloride 0.9 % infusion, 125 mL/hr, Intravenous, Continuous, Jacky Garcia MD, Last Rate: 125 mL/hr at 07/18/18 0633, 125 mL/hr at 07/18/18 0633     Diagnostic Data    Lab Results (last 24 hours)     Procedure Component Value Units Date/Time    Basic Metabolic Panel [[id number]]  (Abnormal) Collected:  07/18/18 0538    Specimen:  Blood Updated:  07/18/18 0650     Glucose 84 mg/dL      BUN 14 mg/dL      Creatinine 0.73 mg/dL      Sodium 141 mmol/L      Potassium 4.8 mmol/L      Chloride 110 mmol/L      CO2 28.0 mmol/L      Calcium 8.6 mg/dL      eGFR Non African Amer 81 mL/min/1.73      BUN/Creatinine Ratio 19.2     Anion Gap 3.0 (L) mmol/L     CBC & Differential [[id number]] Collected:  07/18/18 0539    Specimen:  Blood Updated:  07/18/18 0636    Narrative:       The following orders were created for panel order CBC & Differential.  Procedure                               Abnormality         Status                     ---------                               -----------         ------                     CBC Auto Differential[[id number]]        Abnormal            Final result                 Please view results for these tests on the individual orders.    CBC Auto Differential [[id number]]  (Abnormal) Collected:  07/18/18 0539    Specimen:  Blood Updated:  07/18/18 0636     WBC 7.63 10*3/mm3      RBC 3.77 10*6/mm3      Hemoglobin 11.9 (L) g/dL      Hematocrit 35.4 %      MCV 93.9 fL      MCH 31.6 pg      MCHC 33.6 g/dL      RDW 14.2 %      RDW-SD 48.8 (H) fl      MPV 9.4 fL      Platelets 238 10*3/mm3      Neutrophil % 46.1 %      Lymphocyte % 40.6 %      Monocyte % 11.1 %      Eosinophil % 1.6 %      Basophil % 0.3 %      Immature Grans % 0.3 %       Neutrophils, Absolute 3.52 10*3/mm3      Lymphocytes, Absolute 3.10 10*3/mm3      Monocytes, Absolute 0.85 10*3/mm3      Eosinophils, Absolute 0.12 10*3/mm3      Basophils, Absolute 0.02 10*3/mm3      Immature Grans, Absolute 0.02 10*3/mm3     Blood Culture - Blood, [[id number]]  (Normal) Collected:  07/17/18 1121    Specimen:  Blood from Arm, Right Updated:  07/18/18 0030     Blood Culture No growth at less than 24 hours    Blood Culture - Blood, [[id number]]  (Normal) Collected:  07/17/18 1121    Specimen:  Blood from Arm, Left Updated:  07/18/18 0030     Blood Culture No growth at less than 24 hours    Urinalysis, Microscopic Only - Urine, Clean Catch [[id number]]  (Abnormal) Collected:  07/17/18 1057    Specimen:  Urine from Urine, Clean Catch Updated:  07/17/18 1132     RBC, UA Too Numerous to Count (A) /HPF      WBC, UA 3-5 /HPF      Bacteria, UA Trace (A) /HPF      Squamous Epithelial Cells, UA 6-12 (A) /HPF      Hyaline Casts, UA 0-2 /LPF      Methodology Manual Light Microscopy    Urinalysis With Culture If Indicated - Urine, Clean Catch [[id number]]  (Abnormal) Collected:  07/17/18 1057    Specimen:  Urine from Urine, Clean Catch Updated:  07/17/18 1119     Color, UA Yellow     Appearance, UA Cloudy (A)     pH, UA <=5.0     Specific Gravity, UA 1.010     Glucose, UA Negative     Ketones, UA Negative     Bilirubin, UA Negative     Blood, UA Large (3+) (A)     Protein, UA Negative     Leuk Esterase, UA Negative     Nitrite, UA Negative     Urobilinogen, UA 0.2 E.U./dL           Imaging Results (last 24 hours)     Procedure Component Value Units Date/Time    US Renal Bilateral [[id number]] Collected:  07/17/18 0830     Updated:  07/17/18 1518    Narrative:       Ultrasound renal complete    HISTORY:  Hydronephrosis. Renal colic. Tubulo- interstitial  nephritis.    Ultrasound examination of the kidneys and urinary bladder was  performed.    COMPARISON: None. Correlation CT July 16, 2018    The right kidney  measures 11.12 cm in length by 6.78 cm x 6.46 cm  transverse.  The left kidney measures 9.31 cm in length by 5.72 cm x 4.87 cm  transverse.  Echogenicity of the kidneys is somewhat increased, suggesting  medical renal disease.  Minimal to moderate right hydronephrosis similar to earlier CT  and again suggesting UPJ obstruction.  No solid or cystic renal mass.    Images of the urinary bladder are unremarkable.      Impression:       CONCLUSION:  Echogenicity of the kidneys is somewhat increased, suggesting  medical renal disease.  Minimal to moderate right hydronephrosis similar to earlier CT  and again suggesting UPJ obstruction.    [id number]    Electronically signed by:  Yahir Lynn MD  7/17/2018 3:17 PM CDT  Workstation: [employer] reviewed the patient's new clinical results.    Assessment/Plan:     Hospital Problem List     * (Principal)Renal colic on right side    COPD (chronic obstructive pulmonary disease) (CMS/HCC)    HLD (hyperlipidemia)    HTN (hypertension)    Hydronephrosis    Chronic respiratory failure with hypoxia (CMS/HCC) (Chronic)        Plan:  1. Renal colic on right side   - Keep NPO for OR with Dr. Negro.   - Continue Rocephin for possible cystitis (started on 7/18/18)  2. Hydronephrosis   - Unknown cause. Monitor and GI recommendations to follow.  3. Hypertension   -Continue home medications. Atenolol was restarted (7/18/18).   4.COPD   - Not in exacerbation. Continue home regimen.   5.Hyperlipidemia   - Continue home regimen.  6.Chronic respiratory failure with hypoxia   - Currently at baseline. On room air    DVT prophylaxis: SCDs/TEDs  Code Status and Medical Interventions:   Ordered at: 07/17/18 0152     Level Of Support Discussed With:    Patient     Code Status:    CPR     Medical Interventions (Level of Support Prior to Arrest):    Full       Plan for disposition:Monitor symptoms and treat appropriately       Time: 15 min

## 2018-07-18 NOTE — H&P (VIEW-ONLY)
FAMILY MEDICINE DAILY PROGRESS NOTE  NAME: Prabha Gonzalez  : 1960  MRN: [id number]     LOS: 1 day     PROVIDER OF SERVICE: Ryan Barbosa MD    Chief Complaint: Renal colic on right side    Subjective:     Interval History:  History taken from: patient  Ms. Gonzalez was seen this morning comfortable in bed. Her vital signs were stable overnight except for an elevate BP of 160/86. She was started on Atenolol 25 mg qd and will monitor. Hydralazine as a prn. Her wbc are trending down.      Review of Systems:   Review of Systems   Constitutional: Negative for activity change, appetite change and fever.   HENT: Negative for rhinorrhea, sneezing and sore throat.    Respiratory: Negative for cough, chest tightness, shortness of breath and wheezing.    Cardiovascular: Negative for chest pain and leg swelling.   Gastrointestinal: Negative for abdominal distention and abdominal pain.   Genitourinary: Positive for decreased urine volume and difficulty urinating.   Musculoskeletal: Negative for arthralgias and myalgias.   Skin: Negative for color change and pallor.   Neurological: Negative for dizziness and light-headedness.   Psychiatric/Behavioral: Negative for behavioral problems. The patient is nervous/anxious.        Objective:     Vital Signs  Temp:  [97.3 °F (36.3 °C)-98.3 °F (36.8 °C)] 97.5 °F (36.4 °C)  Heart Rate:  [53-71] 66  Resp:  [16-18] 18  BP: (104-188)/(51-86) 160/86    Physical Exam  Physical Exam   Constitutional: She is oriented to person, place, and time. She appears well-developed and well-nourished.   HENT:   Head: Normocephalic and atraumatic.   Right Ear: External ear normal.   Left Ear: External ear normal.   Eyes: Pupils are equal, round, and reactive to light. EOM are normal.   Neck: Normal range of motion. Neck supple.   Cardiovascular: Normal rate, regular rhythm and normal heart sounds.  Exam reveals no gallop and no friction rub.    No murmur heard.  Pulmonary/Chest: Effort normal and  breath sounds normal. No respiratory distress. She has no wheezes. She has no rales.   Abdominal: Soft. Bowel sounds are normal. She exhibits no distension. There is tenderness.   Musculoskeletal: Normal range of motion. She exhibits no edema.   Neurological: She is alert and oriented to person, place, and time.   Skin: Skin is warm. No erythema.   Psychiatric: Her behavior is normal. Her mood appears anxious.       Medication Review    Current Facility-Administered Medications:   •  albuterol (PROVENTIL) nebulizer solution 0.083% 2.5 mg/3mL, 2.5 mg, Nebulization, Q6H PRN, Tristan Acosta MD  •  albuterol (PROVENTIL) nebulizer solution 0.083% 2.5 mg/3mL, 2.5 mg, Nebulization, Q6H PRN, Tung Ledesma MD  •  aspirin EC tablet 81 mg, 81 mg, Oral, Daily, Tristan Acosta MD, 81 mg at 07/17/18 0823  •  atenolol (TENORMIN) tablet 25 mg, 25 mg, Oral, Daily, Tung Ledesma MD  •  budesonide-formoterol (SYMBICORT) 160-4.5 MCG/ACT inhaler 2 puff, 2 puff, Inhalation, BID - RT, Tristan Acosta MD, 2 puff at 07/18/18 0722  •  budesonide-formoterol (SYMBICORT) 80-4.5 MCG/ACT inhaler 2 puff, 2 puff, Inhalation, BID, Tung Ledesma MD  •  cefTRIAXone (ROCEPHIN) 1 g/100 mL 0.9% NS (MBP), 1 g, Intravenous, Q24H, Tristan Acosta MD, 1 g at 07/18/18 0027  •  clopidogrel (PLAVIX) tablet 75 mg, 75 mg, Oral, Daily, Tristan Acosta MD, 75 mg at 07/17/18 0823  •  escitalopram (LEXAPRO) tablet 10 mg, 10 mg, Oral, QAM, Tristan Acosta MD, 10 mg at 07/18/18 0633  •  FLUoxetine (PROzac) capsule 10 mg, 10 mg, Oral, Daily, Tung Ledesma MD  •  hydrALAZINE (APRESOLINE) injection 10 mg, 10 mg, Intravenous, Q6H PRN, Tung Ledesma MD  •  HYDROcodone-acetaminophen (NORCO) 5-325 MG per tablet 1 tablet, 1 tablet, Oral, Q6H PRN, Jorge Negro MD, 1 tablet at 07/18/18 0643  •  metaxalone (SKELAXIN) tablet 800 mg, 800 mg, Oral, 4x Daily, Tung Ledesma MD  •  nitroglycerin (NITROSTAT) SL tablet 0.4 mg, 0.4 mg, Sublingual, Q5 Min PRN, Tung Ledesma MD  •  ondansetron  (ZOFRAN) injection 4 mg, 4 mg, Intravenous, Q6H PRN, Tristan Acosta MD  •  ramipril (ALTACE) capsule 1.25 mg, 1.25 mg, Oral, Q24H, Tristan Acosta MD, 1.25 mg at 07/18/18 0831  •  sodium chloride 0.9 % flush 1-10 mL, 1-10 mL, Intravenous, PRN, Tristan Acosta MD  •  sodium chloride 0.9 % infusion, 125 mL/hr, Intravenous, Continuous, Jacky Garcia MD, Last Rate: 125 mL/hr at 07/18/18 0633, 125 mL/hr at 07/18/18 0633     Diagnostic Data    Lab Results (last 24 hours)     Procedure Component Value Units Date/Time    Basic Metabolic Panel [[id number]]  (Abnormal) Collected:  07/18/18 0538    Specimen:  Blood Updated:  07/18/18 0650     Glucose 84 mg/dL      BUN 14 mg/dL      Creatinine 0.73 mg/dL      Sodium 141 mmol/L      Potassium 4.8 mmol/L      Chloride 110 mmol/L      CO2 28.0 mmol/L      Calcium 8.6 mg/dL      eGFR Non African Amer 81 mL/min/1.73      BUN/Creatinine Ratio 19.2     Anion Gap 3.0 (L) mmol/L     CBC & Differential [[id number]] Collected:  07/18/18 0539    Specimen:  Blood Updated:  07/18/18 0636    Narrative:       The following orders were created for panel order CBC & Differential.  Procedure                               Abnormality         Status                     ---------                               -----------         ------                     CBC Auto Differential[[id number]]        Abnormal            Final result                 Please view results for these tests on the individual orders.    CBC Auto Differential [[id number]]  (Abnormal) Collected:  07/18/18 0539    Specimen:  Blood Updated:  07/18/18 0636     WBC 7.63 10*3/mm3      RBC 3.77 10*6/mm3      Hemoglobin 11.9 (L) g/dL      Hematocrit 35.4 %      MCV 93.9 fL      MCH 31.6 pg      MCHC 33.6 g/dL      RDW 14.2 %      RDW-SD 48.8 (H) fl      MPV 9.4 fL      Platelets 238 10*3/mm3      Neutrophil % 46.1 %      Lymphocyte % 40.6 %      Monocyte % 11.1 %      Eosinophil % 1.6 %      Basophil % 0.3 %      Immature Grans % 0.3 %       Neutrophils, Absolute 3.52 10*3/mm3      Lymphocytes, Absolute 3.10 10*3/mm3      Monocytes, Absolute 0.85 10*3/mm3      Eosinophils, Absolute 0.12 10*3/mm3      Basophils, Absolute 0.02 10*3/mm3      Immature Grans, Absolute 0.02 10*3/mm3     Blood Culture - Blood, [[id number]]  (Normal) Collected:  07/17/18 1121    Specimen:  Blood from Arm, Right Updated:  07/18/18 0030     Blood Culture No growth at less than 24 hours    Blood Culture - Blood, [[id number]]  (Normal) Collected:  07/17/18 1121    Specimen:  Blood from Arm, Left Updated:  07/18/18 0030     Blood Culture No growth at less than 24 hours    Urinalysis, Microscopic Only - Urine, Clean Catch [[id number]]  (Abnormal) Collected:  07/17/18 1057    Specimen:  Urine from Urine, Clean Catch Updated:  07/17/18 1132     RBC, UA Too Numerous to Count (A) /HPF      WBC, UA 3-5 /HPF      Bacteria, UA Trace (A) /HPF      Squamous Epithelial Cells, UA 6-12 (A) /HPF      Hyaline Casts, UA 0-2 /LPF      Methodology Manual Light Microscopy    Urinalysis With Culture If Indicated - Urine, Clean Catch [[id number]]  (Abnormal) Collected:  07/17/18 1057    Specimen:  Urine from Urine, Clean Catch Updated:  07/17/18 1119     Color, UA Yellow     Appearance, UA Cloudy (A)     pH, UA <=5.0     Specific Gravity, UA 1.010     Glucose, UA Negative     Ketones, UA Negative     Bilirubin, UA Negative     Blood, UA Large (3+) (A)     Protein, UA Negative     Leuk Esterase, UA Negative     Nitrite, UA Negative     Urobilinogen, UA 0.2 E.U./dL           Imaging Results (last 24 hours)     Procedure Component Value Units Date/Time    US Renal Bilateral [[id number]] Collected:  07/17/18 0830     Updated:  07/17/18 1518    Narrative:       Ultrasound renal complete    HISTORY:  Hydronephrosis. Renal colic. Tubulo- interstitial  nephritis.    Ultrasound examination of the kidneys and urinary bladder was  performed.    COMPARISON: None. Correlation CT July 16, 2018    The right kidney  measures 11.12 cm in length by 6.78 cm x 6.46 cm  transverse.  The left kidney measures 9.31 cm in length by 5.72 cm x 4.87 cm  transverse.  Echogenicity of the kidneys is somewhat increased, suggesting  medical renal disease.  Minimal to moderate right hydronephrosis similar to earlier CT  and again suggesting UPJ obstruction.  No solid or cystic renal mass.    Images of the urinary bladder are unremarkable.      Impression:       CONCLUSION:  Echogenicity of the kidneys is somewhat increased, suggesting  medical renal disease.  Minimal to moderate right hydronephrosis similar to earlier CT  and again suggesting UPJ obstruction.    [id number]    Electronically signed by:  Yahir Lynn MD  7/17/2018 3:17 PM CDT  Workstation: [employer] reviewed the patient's new clinical results.    Assessment/Plan:     Hospital Problem List     * (Principal)Renal colic on right side    COPD (chronic obstructive pulmonary disease) (CMS/HCC)    HLD (hyperlipidemia)    HTN (hypertension)    Hydronephrosis    Chronic respiratory failure with hypoxia (CMS/HCC) (Chronic)        Plan:  1. Renal colic on right side   - Keep NPO for OR with Dr. Negro.   - Continue Rocephin for possible cystitis (started on 7/18/18)  2. Hydronephrosis   - Unknown cause. Monitor and GI recommendations to follow.  3. Hypertension   -Continue home medications. Atenolol was restarted (7/18/18).   4.COPD   - Not in exacerbation. Continue home regimen.   5.Hyperlipidemia   - Continue home regimen.  6.Chronic respiratory failure with hypoxia   - Currently at baseline. On room air    DVT prophylaxis: SCDs/TEDs  Code Status and Medical Interventions:   Ordered at: 07/17/18 0152     Level Of Support Discussed With:    Patient     Code Status:    CPR     Medical Interventions (Level of Support Prior to Arrest):    Full       Plan for disposition:Monitor symptoms and treat appropriately       Time: 15 min

## 2018-07-19 LAB
ANION GAP SERPL CALCULATED.3IONS-SCNC: 4 MMOL/L (ref 5–15)
BASOPHILS # BLD AUTO: 0.02 10*3/MM3 (ref 0–0.2)
BASOPHILS NFR BLD AUTO: 0.3 % (ref 0–2)
BUN BLD-MCNC: 7 MG/DL (ref 7–21)
BUN/CREAT SERPL: 13.7 (ref 7–25)
CALCIUM SPEC-SCNC: 8.8 MG/DL (ref 8.4–10.2)
CHLORIDE SERPL-SCNC: 109 MMOL/L (ref 95–110)
CO2 SERPL-SCNC: 26 MMOL/L (ref 22–31)
CREAT BLD-MCNC: 0.51 MG/DL (ref 0.5–1)
DEPRECATED RDW RBC AUTO: 48.3 FL (ref 36.4–46.3)
EOSINOPHIL # BLD AUTO: 0.08 10*3/MM3 (ref 0–0.7)
EOSINOPHIL NFR BLD AUTO: 1 % (ref 0–7)
ERYTHROCYTE [DISTWIDTH] IN BLOOD BY AUTOMATED COUNT: 14.2 % (ref 11.5–14.5)
GFR SERPL CREATININE-BSD FRML MDRD: 123 ML/MIN/1.73 (ref 45–104)
GLUCOSE BLD-MCNC: 84 MG/DL (ref 60–100)
HCT VFR BLD AUTO: 35 % (ref 35–45)
HGB BLD-MCNC: 11.8 G/DL (ref 12–15.5)
IMM GRANULOCYTES # BLD: 0.02 10*3/MM3 (ref 0–0.02)
IMM GRANULOCYTES NFR BLD: 0.3 % (ref 0–0.5)
LYMPHOCYTES # BLD AUTO: 2.08 10*3/MM3 (ref 0.6–4.2)
LYMPHOCYTES NFR BLD AUTO: 26 % (ref 10–50)
MCH RBC QN AUTO: 31.4 PG (ref 26.5–34)
MCHC RBC AUTO-ENTMCNC: 33.7 G/DL (ref 31.4–36)
MCV RBC AUTO: 93.1 FL (ref 80–98)
MONOCYTES # BLD AUTO: 0.81 10*3/MM3 (ref 0–0.9)
MONOCYTES NFR BLD AUTO: 10.1 % (ref 0–12)
NEUTROPHILS # BLD AUTO: 4.98 10*3/MM3 (ref 2–8.6)
NEUTROPHILS NFR BLD AUTO: 62.3 % (ref 37–80)
PLATELET # BLD AUTO: 218 10*3/MM3 (ref 150–450)
PMV BLD AUTO: 9.6 FL (ref 8–12)
POTASSIUM BLD-SCNC: 3.5 MMOL/L (ref 3.5–5.1)
RBC # BLD AUTO: 3.76 10*6/MM3 (ref 3.77–5.16)
SODIUM BLD-SCNC: 139 MMOL/L (ref 137–145)
WBC NRBC COR # BLD: 7.99 10*3/MM3 (ref 3.2–9.8)

## 2018-07-19 PROCEDURE — 85025 COMPLETE CBC W/AUTO DIFF WBC: CPT | Performed by: FAMILY MEDICINE

## 2018-07-19 PROCEDURE — 25010000002 MORPHINE PER 10 MG: Performed by: FAMILY MEDICINE

## 2018-07-19 PROCEDURE — 94799 UNLISTED PULMONARY SVC/PX: CPT

## 2018-07-19 PROCEDURE — 25010000002 HYDRALAZINE PER 20 MG: Performed by: FAMILY MEDICINE

## 2018-07-19 PROCEDURE — 80048 BASIC METABOLIC PNL TOTAL CA: CPT | Performed by: FAMILY MEDICINE

## 2018-07-19 PROCEDURE — 94760 N-INVAS EAR/PLS OXIMETRY 1: CPT

## 2018-07-19 PROCEDURE — 25010000002 CEFTRIAXONE PER 250 MG: Performed by: FAMILY MEDICINE

## 2018-07-19 RX ORDER — RAMIPRIL 5 MG/1
5 CAPSULE ORAL
Status: DISCONTINUED | OUTPATIENT
Start: 2018-07-19 | End: 2018-07-19

## 2018-07-19 RX ORDER — RAMIPRIL 5 MG/1
5 CAPSULE ORAL ONCE
Status: COMPLETED | OUTPATIENT
Start: 2018-07-19 | End: 2018-07-19

## 2018-07-19 RX ORDER — RAMIPRIL 2.5 MG/1
2.5 CAPSULE ORAL
Status: DISCONTINUED | OUTPATIENT
Start: 2018-07-19 | End: 2018-07-20 | Stop reason: HOSPADM

## 2018-07-19 RX ADMIN — METAXALONE 800 MG: 800 TABLET ORAL at 13:12

## 2018-07-19 RX ADMIN — HYDROCODONE BITARTRATE AND ACETAMINOPHEN 1 TABLET: 5; 325 TABLET ORAL at 20:05

## 2018-07-19 RX ADMIN — MORPHINE SULFATE 1 MG: 2 INJECTION, SOLUTION INTRAMUSCULAR; INTRAVENOUS at 15:40

## 2018-07-19 RX ADMIN — METAXALONE 800 MG: 800 TABLET ORAL at 20:05

## 2018-07-19 RX ADMIN — SODIUM CHLORIDE 125 ML/HR: 9 INJECTION, SOLUTION INTRAVENOUS at 20:05

## 2018-07-19 RX ADMIN — MORPHINE SULFATE 1 MG: 2 INJECTION, SOLUTION INTRAMUSCULAR; INTRAVENOUS at 23:54

## 2018-07-19 RX ADMIN — MORPHINE SULFATE 1 MG: 2 INJECTION, SOLUTION INTRAMUSCULAR; INTRAVENOUS at 04:22

## 2018-07-19 RX ADMIN — METAXALONE 800 MG: 800 TABLET ORAL at 08:49

## 2018-07-19 RX ADMIN — HYDRALAZINE HYDROCHLORIDE 10 MG: 20 INJECTION, SOLUTION INTRAMUSCULAR; INTRAVENOUS at 04:18

## 2018-07-19 RX ADMIN — SODIUM CHLORIDE 125 ML/HR: 9 INJECTION, SOLUTION INTRAVENOUS at 01:43

## 2018-07-19 RX ADMIN — HYDROCODONE BITARTRATE AND ACETAMINOPHEN 1 TABLET: 5; 325 TABLET ORAL at 06:09

## 2018-07-19 RX ADMIN — BUDESONIDE AND FORMOTEROL FUMARATE DIHYDRATE 2 PUFF: 160; 4.5 AEROSOL RESPIRATORY (INHALATION) at 08:32

## 2018-07-19 RX ADMIN — SODIUM CHLORIDE 125 ML/HR: 9 INJECTION, SOLUTION INTRAVENOUS at 17:19

## 2018-07-19 RX ADMIN — ESCITALOPRAM OXALATE 10 MG: 10 TABLET ORAL at 06:09

## 2018-07-19 RX ADMIN — SODIUM CHLORIDE 125 ML/HR: 9 INJECTION, SOLUTION INTRAVENOUS at 09:51

## 2018-07-19 RX ADMIN — METAXALONE 800 MG: 800 TABLET ORAL at 17:48

## 2018-07-19 RX ADMIN — CEFTRIAXONE SODIUM 1 G: 1 INJECTION, POWDER, FOR SOLUTION INTRAMUSCULAR; INTRAVENOUS at 00:21

## 2018-07-19 RX ADMIN — BUDESONIDE AND FORMOTEROL FUMARATE DIHYDRATE 2 PUFF: 160; 4.5 AEROSOL RESPIRATORY (INHALATION) at 18:45

## 2018-07-19 RX ADMIN — RAMIPRIL 5 MG: 5 CAPSULE ORAL at 01:04

## 2018-07-19 RX ADMIN — RAMIPRIL 2.5 MG: 2.5 CAPSULE ORAL at 15:10

## 2018-07-19 RX ADMIN — HYDROCODONE BITARTRATE AND ACETAMINOPHEN 1 TABLET: 5; 325 TABLET ORAL at 13:12

## 2018-07-19 RX ADMIN — ATENOLOL 25 MG: 25 TABLET ORAL at 08:49

## 2018-07-19 NOTE — PROGRESS NOTES
FAMILY MEDICINE DAILY PROGRESS NOTE  NAME: Prabha Gonzalez  : 1960  MRN: [id number]     LOS: 2 days     PROVIDER OF SERVICE: Ryan Barbosa MD    Chief Complaint: Renal colic on right side    Subjective:     Interval History:  History taken from: patient chart  Ms. Gonzalez was seen this morning comfortable in bed with no acute complaints overnight. Her vital signs were stable aside from an episode of elevated blood pressure which has resolved. She says she can feel her BP has decreased since yesterday after we restarted her home med. She had her cystoscopy yesterday with right JJ stent placement. 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 She only complains of some RLQ tenderness. Urine is pink, improved from yesterday. She is hungry and will be started on cardiac/renal diet. Hold Aspirin and Plavix until tomorrow.    Review of Systems:   Review of Systems   Constitutional: Negative for chills and fever.   HENT: Negative for rhinorrhea, sore throat and trouble swallowing.    Respiratory: Negative for cough, choking, chest tightness and shortness of breath.    Cardiovascular: Negative for chest pain and leg swelling.   Gastrointestinal: Positive for abdominal pain. Negative for abdominal distention.   Genitourinary: Positive for hematuria. Negative for difficulty urinating.   Musculoskeletal: Negative for arthralgias and myalgias.   Skin: Negative for color change and pallor.   Neurological: Negative for dizziness and light-headedness.   Psychiatric/Behavioral: Negative for agitation and behavioral problems.       Objective:     Vital Signs  Temp:  [96.8 °F (36 °C)-98.3 °F (36.8 °C)] 97.9 °F (36.6 °C)  Heart Rate:  [48-67] 67  Resp:  [14-22] 20  BP: (132-226)/() 132/62    Physical Exam  Physical Exam   Constitutional: She is oriented to person, place, and time. She appears well-developed and well-nourished.   HENT:   Head: Normocephalic and atraumatic.   Right Ear: External ear normal.    Left Ear: External ear normal.   Eyes: Pupils are equal, round, and reactive to light. EOM are normal.   Neck: Normal range of motion. Neck supple.   Cardiovascular: Normal rate, regular rhythm and normal heart sounds.  Exam reveals no gallop and no friction rub.    No murmur heard.  Pulmonary/Chest: Effort normal and breath sounds normal. No respiratory distress. She has no wheezes. She has no rales.   Abdominal: Soft. Bowel sounds are normal. She exhibits no distension. There is tenderness in the right lower quadrant.   Musculoskeletal: Normal range of motion. She exhibits no edema.   Neurological: She is alert and oriented to person, place, and time.   Skin: Skin is warm. No erythema.   Psychiatric: She has a normal mood and affect. Her behavior is normal.       Medication Review    Current Facility-Administered Medications:   •  albuterol (PROVENTIL) nebulizer solution 0.083% 2.5 mg/3mL, 2.5 mg, Nebulization, Q6H PRN, Tristan Acosta MD  •  albuterol (PROVENTIL) nebulizer solution 0.083% 2.5 mg/3mL, 2.5 mg, Nebulization, Q6H PRN, Tung Ledesma MD  •  aspirin EC tablet 81 mg, 81 mg, Oral, Daily, Tristan Acosta MD, 81 mg at 07/17/18 0823  •  atenolol (TENORMIN) tablet 25 mg, 25 mg, Oral, Daily, Tung Ledesma MD, 25 mg at 07/19/18 0849  •  budesonide-formoterol (SYMBICORT) 160-4.5 MCG/ACT inhaler 2 puff, 2 puff, Inhalation, BID - RT, Tristan Acosta MD, 2 puff at 07/19/18 0832  •  cefTRIAXone (ROCEPHIN) 1 g/100 mL 0.9% NS (MBP), 1 g, Intravenous, Q24H, Tristan Acosta MD, 1 g at 07/19/18 0021  •  clopidogrel (PLAVIX) tablet 75 mg, 75 mg, Oral, Daily, Tristan Acosta MD, 75 mg at 07/17/18 0823  •  escitalopram (LEXAPRO) tablet 10 mg, 10 mg, Oral, QAM, Tristan Acosta MD, 10 mg at 07/19/18 0609  •  hydrALAZINE (APRESOLINE) injection 10 mg, 10 mg, Intravenous, Q6H PRN, Tung Ledesma MD, 10 mg at 07/19/18 3711  •  HYDROcodone-acetaminophen (NORCO) 5-325 MG per tablet 1 tablet, 1 tablet, Oral, Q6H PRN, Jorge Negro MD, 1  tablet at 07/19/18 0609  •  iopamidol (ISOVUE-300) 61 % injection, , , PRN, Jorge Negro MD, 7 mL at 07/18/18 1910  •  metaxalone (SKELAXIN) tablet 800 mg, 800 mg, Oral, 4x Daily, Tung Ledesma MD, 800 mg at 07/19/18 0849  •  morphine injection 1 mg, 1 mg, Intravenous, Q6H PRN, Tung Ledesma MD, 1 mg at 07/19/18 0422  •  nitroglycerin (NITROSTAT) SL tablet 0.4 mg, 0.4 mg, Sublingual, Q5 Min PRN, Tung Leedsma MD  •  ondansetron (ZOFRAN) injection 4 mg, 4 mg, Intravenous, Q6H PRN, Tristan Acosta MD  •  ramipril (ALTACE) capsule 5 mg, 5 mg, Oral, Q24H, Martin Lackey MD  •  sodium chloride 0.9 % flush 1-10 mL, 1-10 mL, Intravenous, PRN, Tristan Acosta MD  •  sodium chloride 0.9 % infusion, 125 mL/hr, Intravenous, Continuous, Jacky Garcia MD, Last Rate: 125 mL/hr at 07/19/18 0143, 125 mL/hr at 07/19/18 0143     Diagnostic Data    Lab Results (last 24 hours)     Procedure Component Value Units Date/Time    Basic Metabolic Panel [[id number]]  (Abnormal) Collected:  07/19/18 0554    Specimen:  Blood Updated:  07/19/18 0701     Glucose 84 mg/dL      BUN 7 mg/dL      Creatinine 0.51 mg/dL      Sodium 139 mmol/L      Potassium 3.5 mmol/L      Chloride 109 mmol/L      CO2 26.0 mmol/L      Calcium 8.8 mg/dL      eGFR Non African Amer 123 (H) mL/min/1.73      BUN/Creatinine Ratio 13.7     Anion Gap 4.0 (L) mmol/L     CBC & Differential [[id number]] Collected:  07/19/18 0554    Specimen:  Blood Updated:  07/19/18 0644    Narrative:       The following orders were created for panel order CBC & Differential.  Procedure                               Abnormality         Status                     ---------                               -----------         ------                     CBC Auto Differential[[id number]]        Abnormal            Final result                 Please view results for these tests on the individual orders.    CBC Auto Differential [[id number]]  (Abnormal) Collected:  07/19/18 0554    Specimen:  Blood  Updated:  07/19/18 0644     WBC 7.99 10*3/mm3      RBC 3.76 (L) 10*6/mm3      Hemoglobin 11.8 (L) g/dL      Hematocrit 35.0 %      MCV 93.1 fL      MCH 31.4 pg      MCHC 33.7 g/dL      RDW 14.2 %      RDW-SD 48.3 (H) fl      MPV 9.6 fL      Platelets 218 10*3/mm3      Neutrophil % 62.3 %      Lymphocyte % 26.0 %      Monocyte % 10.1 %      Eosinophil % 1.0 %      Basophil % 0.3 %      Immature Grans % 0.3 %      Neutrophils, Absolute 4.98 10*3/mm3      Lymphocytes, Absolute 2.08 10*3/mm3      Monocytes, Absolute 0.81 10*3/mm3      Eosinophils, Absolute 0.08 10*3/mm3      Basophils, Absolute 0.02 10*3/mm3      Immature Grans, Absolute 0.02 10*3/mm3     Urinalysis With Culture If Indicated - Urine, Clean Catch [[id number]]  (Normal) Collected:  07/18/18 1516    Specimen:  Urine from Urine, Clean Catch Updated:  07/18/18 1530     Color, UA Yellow     Appearance, UA Clear     pH, UA <=5.0     Specific Gravity, UA 1.007     Glucose, UA Negative     Ketones, UA Negative     Bilirubin, UA Negative     Blood, UA Negative     Protein, UA Negative     Leuk Esterase, UA Negative     Nitrite, UA Negative     Urobilinogen, UA 0.2 E.U./dL    Narrative:       Urine microscopic not indicated.    Blood Culture - Blood, [[id number]]  (Normal) Collected:  07/17/18 1121    Specimen:  Blood from Arm, Right Updated:  07/18/18 1230     Blood Culture No growth at 24 hours    Blood Culture - Blood, [[id number]]  (Normal) Collected:  07/17/18 1121    Specimen:  Blood from Arm, Left Updated:  07/18/18 1230     Blood Culture No growth at 24 hours           Imaging Results (last 24 hours)     Procedure Component Value Units Date/Time    FL Retrograde Pyelogram In OR [[id number]] Resulted:  07/19/18 0817     Updated:  07/19/18 0817          I reviewed the patient's new clinical results.    Assessment/Plan:     Hospital Problem List     * (Principal)Renal colic on right side    COPD (chronic obstructive pulmonary disease) (CMS/[facility])    HLD  (hyperlipidemia)    HTN (hypertension)    Hydronephrosis    Chronic respiratory failure with hypoxia (CMS/HCC) (Chronic)        Plan:  1. Renal colic on right side             - 7/19/18 Cystoscopy performed yesterday with right JJ stent placement. Start renal/cardiac diet.  Hold aspirin and plavix until tomorrow.   - 7/18/18 held NPO for OR with Dr. Negro.  2.Right sided pyelonephritis               - Continue Rocephin (started on 7/18/18)  3. Hydronephrosis              - Unknown cause. Monitor and GI recommendations to follow.  4. Hypertension              -Continue home medications. Atenolol was restarted (7/18/18).   5.COPD              - Not in exacerbation. Continue home regimen.   6.Hyperlipidemia              - Continue home regimen.  7.Chronic respiratory failure with hypoxia              - Currently at baseline. On room air    DVT prophylaxis: SCDs/TEDs  Code Status and Medical Interventions:   Ordered at: 07/17/18 0152     Level Of Support Discussed With:    Patient     Code Status:    CPR     Medical Interventions (Level of Support Prior to Arrest):    Full       Plan for disposition:Continue to monitor and adjust treatment appropriately.      Time: 15 min

## 2018-07-19 NOTE — PROGRESS NOTES
"   LOS: 2 days   Patient Care Team:  BERNARDA Vallejo as PCP - General (Emergency Medicine)    Subjective     Subjective:  Symptoms:  Stable.  (Some bladder pain with urination after J-stent placement, resolving flank pain. ).    Diet:  No nausea or vomiting.    Activity level: Returning to normal.    Pain:  She reports pain is improving.        History taken from: patient chart RN    Objective     Vital Signs  Temp:  [96.8 °F (36 °C)-98.3 °F (36.8 °C)] 97.8 °F (36.6 °C)  Heart Rate:  [48-83] 83  Resp:  [14-22] 20  BP: (132-226)/() 147/74    Objective:  General Appearance:  In no acute distress.    Vital signs: (most recent): Blood pressure 147/74, pulse 83, temperature 97.8 °F (36.6 °C), temperature source Oral, resp. rate 20, height 160 cm (63\"), weight 54.4 kg (120 lb), SpO2 94 %.  Vital signs are normal.  No fever.    Output: Producing urine.    HEENT: Normal HEENT exam.    Lungs:  Normal effort and normal respiratory rate.  Breath sounds clear to auscultation.  She is not in respiratory distress.    Heart: Normal rate.  S1 normal and S2 normal.  No murmur.   Chest: Symmetric chest wall expansion.   Abdomen: Abdomen is soft and non-distended.  Bowel sounds are normal.   There is no abdominal tenderness.     Extremities: Normal range of motion.  There is no dependent edema.    Pulses: Distal pulses are intact.    Neurological: Patient is alert and oriented to person, place and time.  GCS score is 15.    Pupils:  Pupils are equal, round, and reactive to light.    Skin:  Warm and dry.  No rash, ecchymosis or cyanosis.             Results Review:    Lab Results (last 24 hours)     Procedure Component Value Units Date/Time    Blood Culture - Blood, [[id number]]  (Normal) Collected:  07/17/18 1121    Specimen:  Blood from Arm, Right Updated:  07/19/18 1230     Blood Culture No growth at 2 days    Blood Culture - Blood, [[id number]]  (Normal) Collected:  07/17/18 1121    Specimen:  Blood from Arm, Left " Updated:  07/19/18 1230     Blood Culture No growth at 2 days    Basic Metabolic Panel [[id number]]  (Abnormal) Collected:  07/19/18 0554    Specimen:  Blood Updated:  07/19/18 0701     Glucose 84 mg/dL      BUN 7 mg/dL      Creatinine 0.51 mg/dL      Sodium 139 mmol/L      Potassium 3.5 mmol/L      Chloride 109 mmol/L      CO2 26.0 mmol/L      Calcium 8.8 mg/dL      eGFR Non African Amer 123 (H) mL/min/1.73      BUN/Creatinine Ratio 13.7     Anion Gap 4.0 (L) mmol/L     CBC & Differential [[id number]] Collected:  07/19/18 0554    Specimen:  Blood Updated:  07/19/18 0644    Narrative:       The following orders were created for panel order CBC & Differential.  Procedure                               Abnormality         Status                     ---------                               -----------         ------                     CBC Auto Differential[[id number]]        Abnormal            Final result                 Please view results for these tests on the individual orders.    CBC Auto Differential [[id number]]  (Abnormal) Collected:  07/19/18 0554    Specimen:  Blood Updated:  07/19/18 0644     WBC 7.99 10*3/mm3      RBC 3.76 (L) 10*6/mm3      Hemoglobin 11.8 (L) g/dL      Hematocrit 35.0 %      MCV 93.1 fL      MCH 31.4 pg      MCHC 33.7 g/dL      RDW 14.2 %      RDW-SD 48.3 (H) fl      MPV 9.6 fL      Platelets 218 10*3/mm3      Neutrophil % 62.3 %      Lymphocyte % 26.0 %      Monocyte % 10.1 %      Eosinophil % 1.0 %      Basophil % 0.3 %      Immature Grans % 0.3 %      Neutrophils, Absolute 4.98 10*3/mm3      Lymphocytes, Absolute 2.08 10*3/mm3      Monocytes, Absolute 0.81 10*3/mm3      Eosinophils, Absolute 0.08 10*3/mm3      Basophils, Absolute 0.02 10*3/mm3      Immature Grans, Absolute 0.02 10*3/mm3     Urinalysis With Culture If Indicated - Urine, Clean Catch [[id number]]  (Normal) Collected:  07/18/18 1516    Specimen:  Urine from Urine, Clean Catch Updated:  07/18/18 1530     Color, UA Yellow      Appearance, UA Clear     pH, UA <=5.0     Specific Gravity, UA 1.007     Glucose, UA Negative     Ketones, UA Negative     Bilirubin, UA Negative     Blood, UA Negative     Protein, UA Negative     Leuk Esterase, UA Negative     Nitrite, UA Negative     Urobilinogen, UA 0.2 E.U./dL    Narrative:       Urine microscopic not indicated.         Imaging Results (last 24 hours)     Procedure Component Value Units Date/Time    FL Retrograde Pyelogram In OR [[id number]] Resulted:  07/19/18 0817     Updated:  07/19/18 0817           I reviewed the patient's new clinical results.  I reviewed the patient's new imaging results and agree with the interpretation.  I reviewed the patient's other test results and agree with the interpretation      Assessment/Plan     Principal Problem:    Renal colic on right side  Active Problems:    COPD (chronic obstructive pulmonary disease) (CMS/[facility])    HLD (hyperlipidemia)    HTN (hypertension)    Hydronephrosis    Chronic respiratory failure with hypoxia (CMS/[facility])      Assessment & Plan  1. RIGHT UPJ obstruction, hydronephrosis  -Postop 7/18/18 cystoscopy right retrograde and J-stent placement with  Tendoy: tight right UPJ obstruction, hydronephrosis, large extrarenal pelvis  -Estimated Creatinine Clearance: 100.7 mL/min (by C-G formula based on SCr of 0.51 mg/dL).  -Some J-stent pain but flank pain resolving    2. RIGHT pyelonephritis  -WBC 14.79-->11.27-->7.63-->7.99  -Rocephin day #3  -Urine culture: NONE; 7/16/18 and 7/17/18 UA + but 7/18/18 UA is NEGATIVE  -Blood cultures negative 7/17/18     Plan: J-stent in place. Continue Rocephin. Resume aspirin and Plavis tomorrow.     Mitul Sim, BERNARDA  07/19/18  12:58 PM

## 2018-07-19 NOTE — OP NOTE
CYSTOSCOPY RETROGRADE PYELOGRAM AND STENT INSERTION  Procedure Note    Prabha Gonzalez  7/16/2018 - 7/18/2018    Pre-op Diagnosis:   Hydronephrosis, unspecified hydronephrosis type [N13.30]    Post-op Diagnosis:     Post-Op Diagnosis Codes:     * Hydronephrosis, unspecified hydronephrosis type [N13.30]      Procedure(s):  CYSTOSCOPY RETROGRADE PYELOGRAM AND STENT INSERTION    Surgeon(s):  Jorge Negro MD    Anesthesia: Choice    Staff:   Circulator: Nila No RN  Scrub Person: Modesta Paez V  Assistant: Katy Boykin CSA    Estimated Blood Loss: none    Specimens:                None      Drains:   Ureteral Drain/Stent Right ureter 6 Fr. (Active)       Findings: Right UPJ obstruction    Complications: None    Indications: Right flank pain pyelonephritis hydronephrosis    Description of Procedure: Patient brought cystoscopy place in dorsolithotomy position.  Sterile prep and drape genitalia in routine fashion.  A 22 Fijian cystoscope scope placed in the bladder.  Retrograde catheter placed up the right ureter.  Cc of contrast placed up the ureter tight right UPJ obstruction was noted nephrosis kidney was noted.  A large extrarenal pelvis.  With some difficulty we threaded 0.38 guidewire past the obstruction into the renal pelvis.  Over the top guidewire through cystoscope we placed a 6 x 26 double-J stent.  Fluoroscopy showed J stent was in good position after we removed the guidewire.  We drained the bladder removed the scope the patient transferred recovery and her procedure well.    Jorge Negro MD     Date: 7/18/2018  Time: 7:18 PM

## 2018-07-19 NOTE — ANESTHESIA PROCEDURE NOTES
Airway  Urgency: elective    Date/Time: 7/18/2018 7:00 PM  Airway not difficult    General Information and Staff    Patient location during procedure: OR    Indications and Patient Condition  Indications for airway management: airway protection    Preoxygenated: yes  MILS maintained throughout  Mask difficulty assessment: 0 - not attempted    Final Airway Details  Final airway type: supraglottic airway      Successful airway: I-gel  Size 3

## 2018-07-19 NOTE — ANESTHESIA POSTPROCEDURE EVALUATION
Patient: Prabha Gonzalez    Procedure Summary     Date:  07/18/18 Room / Location:  [facility] OR 02 / [facility] OR    Anesthesia Start:  1856 Anesthesia Stop:  1925    Procedure:  CYSTOSCOPY RETROGRADE PYELOGRAM AND STENT INSERTION (Right ) Diagnosis:       Hydronephrosis, unspecified hydronephrosis type      (Hydronephrosis, unspecified hydronephrosis type [N13.30])    Surgeon:  Jorge Negro MD Provider:  Tru Katz MD    Anesthesia Type:  general ASA Status:  3          Anesthesia Type: general  Last vitals  BP   171/84 (07/18/18 1636)   Temp   97 °F (36.1 °C) (07/18/18 1636)   Pulse   53 (07/18/18 1636)   Resp   18 (07/18/18 1636)     SpO2   97 % (07/18/18 1636)     Post Anesthesia Care and Evaluation    Patient location during evaluation: PACU  Patient participation: complete - patient participated  Level of consciousness: lethargic  Pain score: 1  Pain management: adequate  Airway patency: patent  Anesthetic complications: No anesthetic complications  PONV Status: none  Cardiovascular status: acceptable  Respiratory status: acceptable  Hydration status: acceptable  Post Neuraxial Block status: Motor and sensory function returned to baseline

## 2018-07-19 NOTE — PLAN OF CARE
Problem: Patient Care Overview  Goal: Plan of Care Review  Outcome: Ongoing (interventions implemented as appropriate)   07/19/18 0326   Coping/Psychosocial   Plan of Care Reviewed With patient   Plan of Care Review   Progress no change   OTHER   Outcome Summary BP elevated throughout the shift; pt voiding without difficulty since surgery; will continue to monitor.      Goal: Individualization and Mutuality  Outcome: Ongoing (interventions implemented as appropriate)    Goal: Discharge Needs Assessment  Outcome: Ongoing (interventions implemented as appropriate)      Problem: Pain, Acute (Adult)  Goal: Acceptable Pain Control/Comfort Level  Outcome: Ongoing (interventions implemented as appropriate)      Problem: Urine Elimination Impaired (Adult)  Goal: Effective or Improved Urinary Elimination  Outcome: Ongoing (interventions implemented as appropriate)    Goal: Effective Containment of Urine  Outcome: Ongoing (interventions implemented as appropriate)    Goal: Reduced Incontinence Episodes  Outcome: Ongoing (interventions implemented as appropriate)

## 2018-07-20 VITALS
BODY MASS INDEX: 21.44 KG/M2 | DIASTOLIC BLOOD PRESSURE: 60 MMHG | TEMPERATURE: 97.6 F | SYSTOLIC BLOOD PRESSURE: 134 MMHG | HEART RATE: 65 BPM | RESPIRATION RATE: 18 BRPM | HEIGHT: 63 IN | OXYGEN SATURATION: 95 % | WEIGHT: 121 LBS

## 2018-07-20 LAB
ANION GAP SERPL CALCULATED.3IONS-SCNC: 4 MMOL/L (ref 5–15)
BASOPHILS # BLD AUTO: 0.01 10*3/MM3 (ref 0–0.2)
BASOPHILS NFR BLD AUTO: 0.1 % (ref 0–2)
BUN BLD-MCNC: 7 MG/DL (ref 7–21)
BUN/CREAT SERPL: 13 (ref 7–25)
CALCIUM SPEC-SCNC: 8.3 MG/DL (ref 8.4–10.2)
CHLORIDE SERPL-SCNC: 109 MMOL/L (ref 95–110)
CO2 SERPL-SCNC: 26 MMOL/L (ref 22–31)
CREAT BLD-MCNC: 0.54 MG/DL (ref 0.5–1)
DEPRECATED RDW RBC AUTO: 48.6 FL (ref 36.4–46.3)
EOSINOPHIL # BLD AUTO: 0.12 10*3/MM3 (ref 0–0.7)
EOSINOPHIL NFR BLD AUTO: 1.6 % (ref 0–7)
ERYTHROCYTE [DISTWIDTH] IN BLOOD BY AUTOMATED COUNT: 14.2 % (ref 11.5–14.5)
GFR SERPL CREATININE-BSD FRML MDRD: 115 ML/MIN/1.73 (ref 45–104)
GLUCOSE BLD-MCNC: 92 MG/DL (ref 60–100)
HCT VFR BLD AUTO: 31.3 % (ref 35–45)
HGB BLD-MCNC: 10.6 G/DL (ref 12–15.5)
IMM GRANULOCYTES # BLD: 0.01 10*3/MM3 (ref 0–0.02)
IMM GRANULOCYTES NFR BLD: 0.1 % (ref 0–0.5)
LYMPHOCYTES # BLD AUTO: 2.32 10*3/MM3 (ref 0.6–4.2)
LYMPHOCYTES NFR BLD AUTO: 31.4 % (ref 10–50)
MCH RBC QN AUTO: 31.5 PG (ref 26.5–34)
MCHC RBC AUTO-ENTMCNC: 33.9 G/DL (ref 31.4–36)
MCV RBC AUTO: 92.9 FL (ref 80–98)
MONOCYTES # BLD AUTO: 1.03 10*3/MM3 (ref 0–0.9)
MONOCYTES NFR BLD AUTO: 13.9 % (ref 0–12)
NEUTROPHILS # BLD AUTO: 3.9 10*3/MM3 (ref 2–8.6)
NEUTROPHILS NFR BLD AUTO: 52.9 % (ref 37–80)
PLATELET # BLD AUTO: 210 10*3/MM3 (ref 150–450)
PMV BLD AUTO: 9.5 FL (ref 8–12)
POTASSIUM BLD-SCNC: 3.7 MMOL/L (ref 3.5–5.1)
RBC # BLD AUTO: 3.37 10*6/MM3 (ref 3.77–5.16)
SODIUM BLD-SCNC: 139 MMOL/L (ref 137–145)
WBC NRBC COR # BLD: 7.39 10*3/MM3 (ref 3.2–9.8)

## 2018-07-20 PROCEDURE — 94760 N-INVAS EAR/PLS OXIMETRY 1: CPT

## 2018-07-20 PROCEDURE — 25010000002 MORPHINE PER 10 MG: Performed by: FAMILY MEDICINE

## 2018-07-20 PROCEDURE — 94799 UNLISTED PULMONARY SVC/PX: CPT

## 2018-07-20 PROCEDURE — 25010000002 CEFTRIAXONE PER 250 MG: Performed by: FAMILY MEDICINE

## 2018-07-20 PROCEDURE — 85025 COMPLETE CBC W/AUTO DIFF WBC: CPT | Performed by: FAMILY MEDICINE

## 2018-07-20 PROCEDURE — 80048 BASIC METABOLIC PNL TOTAL CA: CPT | Performed by: FAMILY MEDICINE

## 2018-07-20 RX ORDER — CEFDINIR 300 MG/1
300 CAPSULE ORAL 2 TIMES DAILY
Qty: 12 CAPSULE | Refills: 0 | Status: SHIPPED | OUTPATIENT
Start: 2018-07-20 | End: 2018-12-27

## 2018-07-20 RX ORDER — RAMIPRIL 2.5 MG/1
2.5 CAPSULE ORAL
Qty: 30 CAPSULE | Refills: 0 | Status: SHIPPED | OUTPATIENT
Start: 2018-07-21

## 2018-07-20 RX ADMIN — ESCITALOPRAM OXALATE 10 MG: 10 TABLET ORAL at 06:18

## 2018-07-20 RX ADMIN — ATENOLOL 25 MG: 25 TABLET ORAL at 09:14

## 2018-07-20 RX ADMIN — METAXALONE 800 MG: 800 TABLET ORAL at 09:13

## 2018-07-20 RX ADMIN — MORPHINE SULFATE 1 MG: 2 INJECTION, SOLUTION INTRAMUSCULAR; INTRAVENOUS at 06:18

## 2018-07-20 RX ADMIN — BUDESONIDE AND FORMOTEROL FUMARATE DIHYDRATE 2 PUFF: 160; 4.5 AEROSOL RESPIRATORY (INHALATION) at 08:00

## 2018-07-20 RX ADMIN — SODIUM CHLORIDE 125 ML/HR: 9 INJECTION, SOLUTION INTRAVENOUS at 11:18

## 2018-07-20 RX ADMIN — CEFTRIAXONE SODIUM 1 G: 1 INJECTION, POWDER, FOR SOLUTION INTRAMUSCULAR; INTRAVENOUS at 00:28

## 2018-07-20 RX ADMIN — ASPIRIN 81 MG: 81 TABLET, COATED ORAL at 09:14

## 2018-07-20 RX ADMIN — RAMIPRIL 2.5 MG: 2.5 CAPSULE ORAL at 09:14

## 2018-07-20 RX ADMIN — CLOPIDOGREL BISULFATE 75 MG: 75 TABLET ORAL at 09:14

## 2018-07-20 NOTE — PLAN OF CARE
Problem: Patient Care Overview  Goal: Plan of Care Review  Outcome: Ongoing (interventions implemented as appropriate)   07/19/18 1901   Coping/Psychosocial   Plan of Care Reviewed With patient   Plan of Care Review   Progress improving   OTHER   Outcome Summary Pt BP stable and WNL throughout shift. Pt ambulating to bathroom without difficulty., reports right flank pain, but resolved with PRN morphine dose. Pt urine continues to show minimal blood with some small clots.

## 2018-07-20 NOTE — DISCHARGE SUMMARY
[address] [address]  T - [phone]     DISCHARGE SUMMARY         PATIENT  DEMOGRAPHICS   PATIENT NAME: Prabha Gonzalez                      PHYSICIAN: Tung Ledesma MD  : 1960  MRN: [id number]    ADMISSION/DISCHARGE INFO   ADMISSION DATE: 2018   DISCHARGE DATE: 18    ADMISSION DIAGNOSES: Pyelonephritis [N12]  Renal colic on right side [N23]  Other hydronephrosis [N13.39]  Renal colic on right side [N23]  DISCHARGE DIAGNOSES:     Principal Problem:    Renal colic on right side  Active Problems:    COPD (chronic obstructive pulmonary disease) (CMS/[facility])    HLD (hyperlipidemia)    HTN (hypertension)    Hydronephrosis    Chronic respiratory failure with hypoxia (CMS/[facility])      SERVICE:  Medicine       CONSULTS   Consult Orders (all)     Start     Ordered    18 1031  Inpatient Urology Consult  Once     Specialty:  Urology  Provider:  Jorge Negro MD    18 1031    18 0037  Hospitalist (on-call MD unless specified)  Once     Specialty:  Hospitalist  Provider:  Tristan Acosta MD    18 0037          PROCEDURES     Imaging Results (last 24 hours)     ** No results found for the last 24 hours. **          Ct Abdomen Pelvis Without Contrast    Result Date: 2018  Narrative: CT abdomen and pelvis without contrast on 2018 CLINICAL INDICATION: Right-sided back pain, right lower quadrant pain TECHNIQUE: Multiple axial images are obtained throughout the abdomen and pelvis without the administration of contrast. This exam was performed according to our departmental dose-optimization program, which includes automated exposure control, adjustment of the mA and/or kV according to patient size and/or use of iterative reconstruction technique. Total DLP is 279.9 mGy*cm. COMPARISON: 2/10/2015 FINDINGS: Abdomen: There is mild linear atelectasis or scarring in the left lung base. Emphysematous changes are noted in the lung bases.  There is moderate right hydronephrosis with an enlarged extrarenal pelvis with abrupt transition to normal caliber proximal right ureter. The appearance is suggestive of a right UPJ stenosis/obstruction. There is right perinephric stranding that may be related to infection. No ureteral stone is visualized. The findings also could be related to recently passed right-sided stone. Recommend urology consultation and correlation with the patient's urinalysis. There are couple of calcifications in the left kidney that may be dystrophic versus nonobstructing left renal stones, favor dystrophic calcifications. The unenhanced solid abdominal organs are otherwise unremarkable. Vascular calcifications are noted. There is no abdominal adenopathy. There is no free fluid or free air within the abdomen. The abdominal portion of the GI tract is unremarkable. Pelvis: The patient is status post hysterectomy. There is no free fluid in the pelvis. There is no pelvic adenopathy. There is diverticulosis. The appendix is not visualized but no pericecal inflammatory changes are noted. The pelvic portion of the GI tract is otherwise unremarkable. Degenerative changes are noted in the spine. Old left pelvic fractures are noted. No acute bony abnormality is noted. There is mild levoscoliosis of the spine.     Impression: 1. Moderate right hydronephrosis with dilated right renal pelvis with abrupt transition to normal caliber right proximal ureter suggesting a right UPJ stenosis/obstruction. There is also right perinephric stranding. As above recommend urology consultation and correlation with the patient's urinalysis. 2. Diverticulosis. Electronically signed by:  Osvaldo Benítez  7/16/2018 10:52 PM CDT Workstation: [id number]    Us Renal Bilateral    Result Date: 7/17/2018  Narrative: Ultrasound renal complete HISTORY:  Hydronephrosis. Renal colic. Tubulo- interstitial nephritis. Ultrasound examination of the kidneys and urinary bladder  was performed. COMPARISON: None. Correlation CT July 16, 2018 The right kidney measures 11.12 cm in length by 6.78 cm x 6.46 cm transverse. The left kidney measures 9.31 cm in length by 5.72 cm x 4.87 cm transverse. Echogenicity of the kidneys is somewhat increased, suggesting medical renal disease. Minimal to moderate right hydronephrosis similar to earlier CT and again suggesting UPJ obstruction. No solid or cystic renal mass. Images of the urinary bladder are unremarkable.     Impression: CONCLUSION: Echogenicity of the kidneys is somewhat increased, suggesting medical renal disease. Minimal to moderate right hydronephrosis similar to earlier CT and again suggesting UPJ obstruction. [id number] Electronically signed by:  Yahir Lynn MD  7/17/2018 3:17 PM CDT Workstation: [facility]      HISTORY OF PRESENT ILLNESS   Copied from Dr. Acosta   Prabha Gonzalez is a 58 y.o. female who presents with abdominal pain.      Patient presented to ED with complaints of right flank pain that radiated to her abdomen and down towards her groin. She was nauseated and vomited. Patient denies any fever, new or worsening chills, diarrhea, shortness of breath (aside from her baseline), or chest pain.     Patient has a history of passing a kidney stone.       At time of exam her pain has improved, she is not currently nauseated, and she is not vomiting.    DIAGNOSTIC DATA   Labs   Images     HOSPITAL COURSE   Pt was admitted to medical floor, urology consult was obtained, she was found to have hydronephrosis on the CT, J stent was placed by Dr. Xie. Pt tolerated procedure well. She is feeling much better. Plan for out pt follow up with  Fellow office.   Stable for discharge.     Physical Exam   Constitutional: She is oriented to person, place, and time. She appears well-developed and well-nourished.   HENT:   Head: Normocephalic and atraumatic.   Eyes: Pupils are equal, round, and reactive to light. EOM are normal.   Neck:  Normal range of motion.   Cardiovascular: Normal rate, regular rhythm and normal heart sounds.    Pulmonary/Chest: Effort normal and breath sounds normal.   Abdominal: Soft. Bowel sounds are normal.   Musculoskeletal: Normal range of motion.   Neurological: She is alert and oriented to person, place, and time.   Skin: Skin is warm. Capillary refill takes less than 2 seconds.   Psychiatric: She has a normal mood and affect. Her behavior is normal.   Vitals reviewed.         DISCHARGE CONDITION   Stable    DISPOSITION   To home with home health      DISCHARGE MEDICATIONS        Discharge Medications      New Medications      Instructions Start Date   cefdinir 300 MG capsule  Commonly known as:  OMNICEF   300 mg, Oral, 2 Times Daily         Changes to Medications      Instructions Start Date   ramipril 2.5 MG capsule  Commonly known as:  ALTACE  What changed:  · medication strength  · how much to take  · when to take this   2.5 mg, Oral, Every 24 Hours Scheduled         Continue These Medications      Instructions Start Date   albuterol 108 (90 Base) MCG/ACT inhaler  Commonly known as:  VENTOLIN HFA   2 puffs every 4 hours as needed for breathing      albuterol 108 (90 Base) MCG/ACT inhaler  Commonly known as:  VENTOLIN HFA   2 puffs every 4 hours as needed for breathing      aspirin 81 MG EC tablet   81 mg, Oral, Daily      atenolol 25 MG tablet  Commonly known as:  TENORMIN   25 mg, Oral, Daily      calcium carbonate-cholecalciferol 500-400 MG-UNIT tablet tablet   Oral, 2 Times Daily      clopidogrel 75 MG tablet  Commonly known as:  PLAVIX   75 mg, Oral, Daily      cycloSPORINE 0.05 % ophthalmic emulsion  Commonly known as:  RESTASIS   1 drop, Both Eyes, 2 Times Daily      escitalopram 10 MG tablet  Commonly known as:  LEXAPRO   10 mg, Oral, Every Morning      ipratropium-albuterol 0.5-2.5 mg/3 ml nebulizer  Commonly known as:  DUO-NEB   USE 1 VIAL IN NEBULIZER FOUR TIMES DAILY      metaxalone 800 MG  tablet  Commonly known as:  SKELAXIN   800 mg, Oral, 4 Times Daily      nitroglycerin 0.4 MG SL tablet  Commonly known as:  NITROSTAT   0.4 mg, Sublingual, Every 5 Minutes PRN, Take no more than 3 doses in 15 minutes.       OMEGA 3 PO   Oral      SYMBICORT 80-4.5 MCG/ACT inhaler  Generic drug:  budesonide-formoterol   2 puffs, Inhalation, 2 Times Daily      budesonide-formoterol 160-4.5 MCG/ACT inhaler  Commonly known as:  SYMBICORT   2 puffs twice a day      THERA MULTI-VITAMIN PO   1 tablet, Oral, Daily         Stop These Medications    FLUoxetine 10 MG capsule  Commonly known as:  PROzac              INSTRUCTIONS   Activity:   Activity Instructions     Discharge Activity       As tolerated          Diet:   Diet Instructions     Diet: Regular, Cardiac       Discharge Diet:   Regular  Cardiac             Special Instructions: Patient instructed to call M.D. or return to ED with worsening shortness of breath, chest pain, fever greater than 100.4°F or any other medical concerns.    FOLLOW UP   Additional Instructions for the Follow-ups that You Need to Schedule     Ambulatory Referral to Home Health    As directed      Face to Face Visit Date:  7/20/2018    Follow-up Provider for Plan of Care?:  I treated the patient in an acute care facility and will not continue treatment after discharge.    Follow-up Provider:  DUTCH MUIRLLO [[id number]]    Reason/Clinical Findings:  transitional visit    Describe mobility limitations that make leaving home difficult:  transitional visit    Nursing/Therapeutic Services Requested:  Other    Frequency:  1 Week 1           Follow-up Information     GRAHAM Quijano Go on 7/27/2018.    Specialty:  Emergency Medicine  Why:  Follow-up appointment on Friday July 27th at 10:00am  Contact information:  [address] [facility] [id number] [phone]             Jorge Negro MD Follow up in 1 week(s).    Specialty:  Urology  Contact information:  [address]  [address]  [facility]  [address]  [phone]                  PENDING TEST RESULTS AT DISCHARGE    Order Current Status    Blood Culture - Blood, Preliminary result    Blood Culture - Blood, Preliminary result         TIME   Time: 35 minutes were spent planning this discharge.                      This document has been electronically signed by Tung Ledesma MD on July 20, 2018 11:21 AM

## 2018-07-20 NOTE — PROGRESS NOTES
"   LOS: 3 days   Patient Care Team:  BERNARDA Vallejo as PCP - General (Emergency Medicine)    Subjective     Subjective:  Symptoms:  Improved.  (Resolving flank pain, no fever, urine clearing. Wants to go home. ).    Diet:  Adequate intake.  No nausea or vomiting.    Activity level: Returning to normal.    Pain:  She reports pain is improving.        History taken from: patient chart RN    Objective     Vital Signs  Temp:  [97.6 °F (36.4 °C)-98.8 °F (37.1 °C)] 97.6 °F (36.4 °C)  Heart Rate:  [60-76] 65  Resp:  [18-20] 18  BP: (134-169)/(60-77) 134/60    Objective:  General Appearance:  In no acute distress.    Vital signs: (most recent): Blood pressure 134/60, pulse 65, temperature 97.6 °F (36.4 °C), temperature source Temporal Artery , resp. rate 18, height 160 cm (63\"), weight 54.9 kg (121 lb), SpO2 95 %.  Vital signs are normal.  No fever.    Output: Producing urine.    HEENT: Normal HEENT exam.    Lungs:  Normal effort and normal respiratory rate.  Breath sounds clear to auscultation.  She is not in respiratory distress.    Heart: Normal rate.  S1 normal and S2 normal.  No murmur.   Chest: Symmetric chest wall expansion.   Abdomen: Abdomen is soft and non-distended.  Bowel sounds are normal.   There is no abdominal tenderness.     Extremities: Normal range of motion.  There is no dependent edema.    Pulses: Distal pulses are intact.    Neurological: Patient is alert and oriented to person, place and time.  GCS score is 15.    Pupils:  Pupils are equal, round, and reactive to light.    Skin:  Warm and dry.  No rash, ecchymosis or cyanosis.             Results Review:    Lab Results (last 24 hours)     Procedure Component Value Units Date/Time    Blood Culture - Blood, [[id number]]  (Normal) Collected:  07/17/18 1121    Specimen:  Blood from Arm, Right Updated:  07/20/18 1230     Blood Culture No growth at 3 days    Blood Culture - Blood, [[id number]]  (Normal) Collected:  07/17/18 1121    Specimen:  Blood " from Arm, Left Updated:  07/20/18 1230     Blood Culture No growth at 3 days    Basic Metabolic Panel [[id number]]  (Abnormal) Collected:  07/20/18 0551    Specimen:  Blood Updated:  07/20/18 0632     Glucose 92 mg/dL      BUN 7 mg/dL      Creatinine 0.54 mg/dL      Sodium 139 mmol/L      Potassium 3.7 mmol/L      Chloride 109 mmol/L      CO2 26.0 mmol/L      Calcium 8.3 (L) mg/dL      eGFR Non African Amer 115 (H) mL/min/1.73      BUN/Creatinine Ratio 13.0     Anion Gap 4.0 (L) mmol/L     CBC & Differential [[id number]] Collected:  07/20/18 0551    Specimen:  Blood Updated:  07/20/18 0624    Narrative:       The following orders were created for panel order CBC & Differential.  Procedure                               Abnormality         Status                     ---------                               -----------         ------                     CBC Auto Differential[[id number]]        Abnormal            Final result                 Please view results for these tests on the individual orders.    CBC Auto Differential [[id number]]  (Abnormal) Collected:  07/20/18 0551    Specimen:  Blood Updated:  07/20/18 0624     WBC 7.39 10*3/mm3      RBC 3.37 (L) 10*6/mm3      Hemoglobin 10.6 (L) g/dL      Hematocrit 31.3 (L) %      MCV 92.9 fL      MCH 31.5 pg      MCHC 33.9 g/dL      RDW 14.2 %      RDW-SD 48.6 (H) fl      MPV 9.5 fL      Platelets 210 10*3/mm3      Neutrophil % 52.9 %      Lymphocyte % 31.4 %      Monocyte % 13.9 (H) %      Eosinophil % 1.6 %      Basophil % 0.1 %      Immature Grans % 0.1 %      Neutrophils, Absolute 3.90 10*3/mm3      Lymphocytes, Absolute 2.32 10*3/mm3      Monocytes, Absolute 1.03 (H) 10*3/mm3      Eosinophils, Absolute 0.12 10*3/mm3      Basophils, Absolute 0.01 10*3/mm3      Immature Grans, Absolute 0.01 10*3/mm3          Imaging Results (last 24 hours)     ** No results found for the last 24 hours. **           I reviewed the patient's new clinical results.  I reviewed the patient's  new imaging results and agree with the interpretation.  I reviewed the patient's other test results and agree with the interpretation      Assessment/Plan     Principal Problem:    Renal colic on right side  Active Problems:    COPD (chronic obstructive pulmonary disease) (CMS/HCC)    HLD (hyperlipidemia)    HTN (hypertension)    Hydronephrosis    Chronic respiratory failure with hypoxia (CMS/[facility])      Assessment & Plan  1. RIGHT UPJ obstruction, hydronephrosis  -Postop 7/18/18 cystoscopy right retrograde and J-stent placement with Dr. Negro: tight right UPJ obstruction, hydronephrosis, large extrarenal pelvis  -Estimated Creatinine Clearance: 96 mL/min (by C-G formula based on SCr of 0.54 mg/dL).  -Some J-stent pain but flank pain resolving    2. RIGHT pyelonephritis  -WBC 14.79-->11.27-->7.63-->7.99-->7.39  -Rocephin day #4  -Urine culture: NONE; 7/16/18 and 7/17/18 UA + but 7/18/18 UA is NEGATIVE  -Blood cultures negative 7/17/18     Plan: J-stent in place. Follow up with Dr. Negro 1 week from discharge. Agree with cefdinir.     BERNARDA Garcia  07/20/18  1:44 PM

## 2018-07-20 NOTE — PLAN OF CARE
Problem: Patient Care Overview  Goal: Plan of Care Review  Outcome: Ongoing (interventions implemented as appropriate)   07/20/18 0106   Coping/Psychosocial   Plan of Care Reviewed With patient   Plan of Care Review   Progress no change   OTHER   Outcome Summary Pt frequently asks for pain medicine. Received prn norco and morphine as ordered. VSS. Continue IV fluids and atb. Urine still light pink. No other complaints noted. Continue to monitor.        Problem: Pain, Acute (Adult)  Goal: Acceptable Pain Control/Comfort Level  Outcome: Ongoing (interventions implemented as appropriate)      Problem: Urine Elimination Impaired (Adult)  Goal: Effective or Improved Urinary Elimination  Outcome: Ongoing (interventions implemented as appropriate)    Goal: Effective Containment of Urine  Outcome: Ongoing (interventions implemented as appropriate)    Goal: Reduced Incontinence Episodes  Outcome: Ongoing (interventions implemented as appropriate)

## 2018-07-22 LAB
BACTERIA SPEC AEROBE CULT: NORMAL
BACTERIA SPEC AEROBE CULT: NORMAL

## 2018-07-24 ENCOUNTER — HOSPITAL ENCOUNTER (EMERGENCY)
Facility: HOSPITAL | Age: 58
Discharge: HOME | End: 2018-07-25
Attending: EMERGENCY MEDICINE | Admitting: EMERGENCY MEDICINE

## 2018-07-24 ENCOUNTER — APPOINTMENT (OUTPATIENT)
Dept: CT IMAGING | Facility: HOSPITAL | Age: 58
End: 2018-07-24

## 2018-07-24 DIAGNOSIS — R10.9 RIGHT SIDED ABDOMINAL PAIN: Primary | ICD-10-CM

## 2018-07-24 DIAGNOSIS — I10 UNCONTROLLED HYPERTENSION: ICD-10-CM

## 2018-07-24 DIAGNOSIS — N13.30 HYDRONEPHROSIS OF RIGHT KIDNEY: ICD-10-CM

## 2018-07-24 LAB
ALBUMIN SERPL-MCNC: 4.5 G/DL (ref 3.4–4.8)
ALBUMIN/GLOB SERPL: 1.6 G/DL (ref 1.1–1.8)
ALP SERPL-CCNC: 64 U/L (ref 38–126)
ALT SERPL W P-5'-P-CCNC: 25 U/L (ref 9–52)
ANION GAP SERPL CALCULATED.3IONS-SCNC: 10 MMOL/L (ref 5–15)
APTT PPP: 27.4 SECONDS (ref 20–40.3)
AST SERPL-CCNC: 27 U/L (ref 14–36)
BACTERIA UR QL AUTO: ABNORMAL /HPF
BASOPHILS # BLD AUTO: 0.02 10*3/MM3 (ref 0–0.2)
BASOPHILS NFR BLD AUTO: 0.2 % (ref 0–2)
BILIRUB SERPL-MCNC: 0.4 MG/DL (ref 0.2–1.3)
BILIRUB UR QL STRIP: NEGATIVE
BUN BLD-MCNC: 14 MG/DL (ref 7–21)
BUN/CREAT SERPL: 19.2 (ref 7–25)
CALCIUM SPEC-SCNC: 9.9 MG/DL (ref 8.4–10.2)
CHLORIDE SERPL-SCNC: 100 MMOL/L (ref 95–110)
CLARITY UR: ABNORMAL
CO2 SERPL-SCNC: 26 MMOL/L (ref 22–31)
COLOR UR: YELLOW
CREAT BLD-MCNC: 0.73 MG/DL (ref 0.5–1)
DEPRECATED RDW RBC AUTO: 50 FL (ref 36.4–46.3)
EOSINOPHIL # BLD AUTO: 0.27 10*3/MM3 (ref 0–0.7)
EOSINOPHIL NFR BLD AUTO: 2.8 % (ref 0–7)
ERYTHROCYTE [DISTWIDTH] IN BLOOD BY AUTOMATED COUNT: 14.9 % (ref 11.5–14.5)
GFR SERPL CREATININE-BSD FRML MDRD: 81 ML/MIN/1.73 (ref 45–104)
GLOBULIN UR ELPH-MCNC: 2.8 GM/DL (ref 2.3–3.5)
GLUCOSE BLD-MCNC: 87 MG/DL (ref 60–100)
GLUCOSE UR STRIP-MCNC: NEGATIVE MG/DL
HCT VFR BLD AUTO: 40.4 % (ref 35–45)
HGB BLD-MCNC: 13.7 G/DL (ref 12–15.5)
HGB UR QL STRIP.AUTO: ABNORMAL
HOLD SPECIMEN: NORMAL
HYALINE CASTS UR QL AUTO: ABNORMAL /LPF
IMM GRANULOCYTES # BLD: 0.04 10*3/MM3 (ref 0–0.02)
IMM GRANULOCYTES NFR BLD: 0.4 % (ref 0–0.5)
KETONES UR QL STRIP: NEGATIVE
LEUKOCYTE ESTERASE UR QL STRIP.AUTO: ABNORMAL
LYMPHOCYTES # BLD AUTO: 2.84 10*3/MM3 (ref 0.6–4.2)
LYMPHOCYTES NFR BLD AUTO: 29.9 % (ref 10–50)
MCH RBC QN AUTO: 31.9 PG (ref 26.5–34)
MCHC RBC AUTO-ENTMCNC: 33.9 G/DL (ref 31.4–36)
MCV RBC AUTO: 94.2 FL (ref 80–98)
MONOCYTES # BLD AUTO: 1.29 10*3/MM3 (ref 0–0.9)
MONOCYTES NFR BLD AUTO: 13.6 % (ref 0–12)
NEUTROPHILS # BLD AUTO: 5.04 10*3/MM3 (ref 2–8.6)
NEUTROPHILS NFR BLD AUTO: 53.1 % (ref 37–80)
NITRITE UR QL STRIP: NEGATIVE
PH UR STRIP.AUTO: 7 [PH] (ref 5–9)
PLATELET # BLD AUTO: 301 10*3/MM3 (ref 150–450)
PMV BLD AUTO: 9.5 FL (ref 8–12)
POTASSIUM BLD-SCNC: 3.9 MMOL/L (ref 3.5–5.1)
PROT SERPL-MCNC: 7.3 G/DL (ref 6.3–8.6)
PROT UR QL STRIP: ABNORMAL
RBC # BLD AUTO: 4.29 10*6/MM3 (ref 3.77–5.16)
RBC # UR: ABNORMAL /HPF
REF LAB TEST METHOD: ABNORMAL
SODIUM BLD-SCNC: 136 MMOL/L (ref 137–145)
SP GR UR STRIP: 1.01 (ref 1–1.03)
SQUAMOUS #/AREA URNS HPF: ABNORMAL /HPF
UROBILINOGEN UR QL STRIP: ABNORMAL
WBC NRBC COR # BLD: 9.5 10*3/MM3 (ref 3.2–9.8)
WBC UR QL AUTO: ABNORMAL /HPF

## 2018-07-24 PROCEDURE — 25010000002 IOPAMIDOL 61 % SOLUTION: Performed by: EMERGENCY MEDICINE

## 2018-07-24 PROCEDURE — 99284 EMERGENCY DEPT VISIT MOD MDM: CPT

## 2018-07-24 PROCEDURE — 74177 CT ABD & PELVIS W/CONTRAST: CPT

## 2018-07-24 PROCEDURE — 85730 THROMBOPLASTIN TIME PARTIAL: CPT | Performed by: EMERGENCY MEDICINE

## 2018-07-24 PROCEDURE — 85025 COMPLETE CBC W/AUTO DIFF WBC: CPT | Performed by: EMERGENCY MEDICINE

## 2018-07-24 PROCEDURE — 80053 COMPREHEN METABOLIC PANEL: CPT | Performed by: EMERGENCY MEDICINE

## 2018-07-24 PROCEDURE — 96374 THER/PROPH/DIAG INJ IV PUSH: CPT

## 2018-07-24 PROCEDURE — 25010000002 ONDANSETRON PER 1 MG: Performed by: EMERGENCY MEDICINE

## 2018-07-24 PROCEDURE — 81001 URINALYSIS AUTO W/SCOPE: CPT | Performed by: EMERGENCY MEDICINE

## 2018-07-24 PROCEDURE — 25010000002 MORPHINE PER 10 MG: Performed by: EMERGENCY MEDICINE

## 2018-07-24 PROCEDURE — 96375 TX/PRO/DX INJ NEW DRUG ADDON: CPT

## 2018-07-24 RX ORDER — ONDANSETRON 2 MG/ML
4 INJECTION INTRAMUSCULAR; INTRAVENOUS ONCE
Status: COMPLETED | OUTPATIENT
Start: 2018-07-24 | End: 2018-07-24

## 2018-07-24 RX ADMIN — ONDANSETRON 4 MG: 2 INJECTION, SOLUTION INTRAMUSCULAR; INTRAVENOUS at 21:56

## 2018-07-24 RX ADMIN — IOPAMIDOL 70 ML: 612 INJECTION, SOLUTION INTRAVENOUS at 22:48

## 2018-07-24 RX ADMIN — MORPHINE SULFATE 4 MG: 4 INJECTION INTRAVENOUS at 21:56

## 2018-07-25 VITALS
WEIGHT: 120 LBS | OXYGEN SATURATION: 95 % | HEIGHT: 63 IN | BODY MASS INDEX: 21.26 KG/M2 | TEMPERATURE: 98.2 F | RESPIRATION RATE: 18 BRPM | DIASTOLIC BLOOD PRESSURE: 59 MMHG | SYSTOLIC BLOOD PRESSURE: 149 MMHG | HEART RATE: 58 BPM

## 2018-07-25 RX ORDER — HYDROCODONE BITARTRATE AND ACETAMINOPHEN 5; 325 MG/1; MG/1
1 TABLET ORAL EVERY 6 HOURS PRN
Qty: 10 TABLET | Refills: 0 | Status: SHIPPED | OUTPATIENT
Start: 2018-07-25 | End: 2020-01-16

## 2018-12-27 ENCOUNTER — OFFICE VISIT (OUTPATIENT)
Dept: PULMONOLOGY | Facility: CLINIC | Age: 58
End: 2018-12-27

## 2018-12-27 VITALS
DIASTOLIC BLOOD PRESSURE: 70 MMHG | OXYGEN SATURATION: 95 % | BODY MASS INDEX: 19.19 KG/M2 | SYSTOLIC BLOOD PRESSURE: 128 MMHG | WEIGHT: 108.3 LBS | HEART RATE: 71 BPM | HEIGHT: 63 IN

## 2018-12-27 DIAGNOSIS — J43.1 PANLOBULAR EMPHYSEMA (HCC): Primary | ICD-10-CM

## 2018-12-27 PROCEDURE — 99213 OFFICE O/P EST LOW 20 MIN: CPT | Performed by: INTERNAL MEDICINE

## 2018-12-27 RX ORDER — ALBUTEROL SULFATE 2.5 MG/3ML
2.5 SOLUTION RESPIRATORY (INHALATION) EVERY 6 HOURS PRN
Qty: 90 VIAL | Refills: 5 | Status: SHIPPED | OUTPATIENT
Start: 2018-12-27 | End: 2019-02-07 | Stop reason: SDUPTHER

## 2018-12-27 RX ORDER — BUDESONIDE AND FORMOTEROL FUMARATE DIHYDRATE 160; 4.5 UG/1; UG/1
AEROSOL RESPIRATORY (INHALATION)
Qty: 1 INHALER | Refills: 5 | Status: SHIPPED | OUTPATIENT
Start: 2018-12-27 | End: 2019-01-31

## 2018-12-27 RX ORDER — ALBUTEROL SULFATE 90 UG/1
AEROSOL, METERED RESPIRATORY (INHALATION)
Qty: 1 INHALER | Refills: 5 | Status: SHIPPED | OUTPATIENT
Start: 2018-12-27 | End: 2019-01-31

## 2018-12-27 NOTE — PROGRESS NOTES
"This lady has emphysema, chronic bronchitis, persistent tobacco use.  She still smoking and breathing is about the same she has dyspnea on exertion with cough and wheeze    ROS    Constitutional-no night sweats weight loss headaches  GI no abdominal pain nausea or diarrhea  Neuro no seizure or neurologic deficits  Musculoskeletal no deformity or joint pain   no dysuria or hematuria  Skin no rash or other lesions  All other systems reviewed and were negative except for the above.      Physical Exam  /70   Pulse 71   Ht 160 cm (63\")   Wt 49.1 kg (108 lb 4.8 oz)   SpO2 95%   BMI 19.18 kg/m²   Vital signs as above  Pupils equally round and reactive to light and accommodation, neck no JVD or adenopathy.  Cardiovascular regular rhythm and rate no murmur or gallop.  Abdomen soft no organomegaly tenderness.  Extremities no clubbing cyanosis or edema.  No cervical adenopathy.  No skin rash.  Neurologic good strength bilaterally without deficits  Dyspneic white female lungs diminished breath sounds prolonged expiration but no wheeze    Impression emphysema and chronic bronchitis    Recommendations refrain from smoking, refill medications, return in 6 months        This document has been produced with the assistance of Dragon dictation  This document has been electronically signed by Sang Marcos MD on December 27, 2018 1:58 PM      "

## 2019-01-30 DIAGNOSIS — R06.02 SHORTNESS OF BREATH: ICD-10-CM

## 2019-01-30 RX ORDER — IPRATROPIUM BROMIDE AND ALBUTEROL SULFATE 2.5; .5 MG/3ML; MG/3ML
3 SOLUTION RESPIRATORY (INHALATION) TAKE AS DIRECTED
Qty: 360 ML | Refills: 11 | Status: SHIPPED | OUTPATIENT
Start: 2019-01-30 | End: 2020-04-14 | Stop reason: SDUPTHER

## 2019-01-31 ENCOUNTER — HOSPITAL ENCOUNTER (EMERGENCY)
Facility: HOSPITAL | Age: 59
Discharge: HOME | End: 2019-01-31
Attending: FAMILY MEDICINE | Admitting: FAMILY MEDICINE

## 2019-01-31 ENCOUNTER — APPOINTMENT (OUTPATIENT)
Dept: GENERAL RADIOLOGY | Facility: HOSPITAL | Age: 59
End: 2019-01-31

## 2019-01-31 VITALS
DIASTOLIC BLOOD PRESSURE: 68 MMHG | OXYGEN SATURATION: 96 % | HEIGHT: 63 IN | BODY MASS INDEX: 18.96 KG/M2 | HEART RATE: 67 BPM | WEIGHT: 107 LBS | TEMPERATURE: 97.9 F | SYSTOLIC BLOOD PRESSURE: 125 MMHG | RESPIRATION RATE: 20 BRPM

## 2019-01-31 DIAGNOSIS — J44.1 COPD EXACERBATION (HCC): Primary | ICD-10-CM

## 2019-01-31 LAB
ALBUMIN SERPL-MCNC: 3.8 G/DL (ref 3.4–4.8)
ALBUMIN/GLOB SERPL: 1.8 G/DL (ref 1.1–1.8)
ALP SERPL-CCNC: 72 U/L (ref 38–126)
ALT SERPL W P-5'-P-CCNC: 16 U/L (ref 9–52)
ANION GAP SERPL CALCULATED.3IONS-SCNC: 1 MMOL/L (ref 5–15)
AST SERPL-CCNC: 30 U/L (ref 14–36)
BASOPHILS # BLD AUTO: 0.04 10*3/MM3 (ref 0–0.2)
BASOPHILS NFR BLD AUTO: 0.5 % (ref 0–2)
BILIRUB SERPL-MCNC: 0.4 MG/DL (ref 0.2–1.3)
BUN BLD-MCNC: 12 MG/DL (ref 7–21)
BUN/CREAT SERPL: 17.9 (ref 7–25)
CALCIUM SPEC-SCNC: 9.6 MG/DL (ref 8.4–10.2)
CHLORIDE SERPL-SCNC: 107 MMOL/L (ref 95–110)
CO2 SERPL-SCNC: 27 MMOL/L (ref 22–31)
CREAT BLD-MCNC: 0.67 MG/DL (ref 0.5–1)
DEPRECATED RDW RBC AUTO: 47.2 FL (ref 36.4–46.3)
EOSINOPHIL # BLD AUTO: 0.2 10*3/MM3 (ref 0–0.7)
EOSINOPHIL NFR BLD AUTO: 2.3 % (ref 0–7)
ERYTHROCYTE [DISTWIDTH] IN BLOOD BY AUTOMATED COUNT: 13.5 % (ref 11.5–14.5)
GFR SERPL CREATININE-BSD FRML MDRD: 90 ML/MIN/1.73 (ref 45–104)
GLOBULIN UR ELPH-MCNC: 2.1 GM/DL (ref 2.3–3.5)
GLUCOSE BLD-MCNC: 104 MG/DL (ref 60–100)
HCT VFR BLD AUTO: 37.3 % (ref 35–45)
HGB BLD-MCNC: 13 G/DL (ref 12–15.5)
HOLD SPECIMEN: NORMAL
HOLD SPECIMEN: NORMAL
IMM GRANULOCYTES # BLD AUTO: 0.04 10*3/MM3 (ref 0–0.02)
IMM GRANULOCYTES NFR BLD AUTO: 0.5 % (ref 0–0.5)
LYMPHOCYTES # BLD AUTO: 1.09 10*3/MM3 (ref 0.6–4.2)
LYMPHOCYTES NFR BLD AUTO: 12.4 % (ref 10–50)
MCH RBC QN AUTO: 33 PG (ref 26.5–34)
MCHC RBC AUTO-ENTMCNC: 34.9 G/DL (ref 31.4–36)
MCV RBC AUTO: 94.7 FL (ref 80–98)
MONOCYTES # BLD AUTO: 0.68 10*3/MM3 (ref 0–0.9)
MONOCYTES NFR BLD AUTO: 7.8 % (ref 0–12)
NEUTROPHILS # BLD AUTO: 6.72 10*3/MM3 (ref 2–8.6)
NEUTROPHILS NFR BLD AUTO: 76.5 % (ref 37–80)
NRBC BLD AUTO-RTO: 0 /100 WBC (ref 0–0)
NT-PROBNP SERPL-MCNC: 536 PG/ML (ref 0–900)
PLATELET # BLD AUTO: 276 10*3/MM3 (ref 150–450)
PMV BLD AUTO: 9.2 FL (ref 8–12)
POTASSIUM BLD-SCNC: 4.1 MMOL/L (ref 3.5–5.1)
PROT SERPL-MCNC: 5.9 G/DL (ref 6.3–8.6)
RBC # BLD AUTO: 3.94 10*6/MM3 (ref 3.77–5.16)
SODIUM BLD-SCNC: 135 MMOL/L (ref 137–145)
TROPONIN I SERPL-MCNC: <0.012 NG/ML
WBC NRBC COR # BLD: 8.77 10*3/MM3 (ref 3.2–9.8)
WHOLE BLOOD HOLD SPECIMEN: NORMAL
WHOLE BLOOD HOLD SPECIMEN: NORMAL

## 2019-01-31 PROCEDURE — 85025 COMPLETE CBC W/AUTO DIFF WBC: CPT | Performed by: FAMILY MEDICINE

## 2019-01-31 PROCEDURE — 71046 X-RAY EXAM CHEST 2 VIEWS: CPT

## 2019-01-31 PROCEDURE — 93005 ELECTROCARDIOGRAM TRACING: CPT | Performed by: FAMILY MEDICINE

## 2019-01-31 PROCEDURE — 83880 ASSAY OF NATRIURETIC PEPTIDE: CPT | Performed by: FAMILY MEDICINE

## 2019-01-31 PROCEDURE — 80053 COMPREHEN METABOLIC PANEL: CPT | Performed by: FAMILY MEDICINE

## 2019-01-31 PROCEDURE — 99284 EMERGENCY DEPT VISIT MOD MDM: CPT

## 2019-01-31 PROCEDURE — 93010 ELECTROCARDIOGRAM REPORT: CPT | Performed by: INTERNAL MEDICINE

## 2019-01-31 PROCEDURE — 84484 ASSAY OF TROPONIN QUANT: CPT | Performed by: FAMILY MEDICINE

## 2019-01-31 RX ORDER — IPRATROPIUM BROMIDE AND ALBUTEROL SULFATE 2.5; .5 MG/3ML; MG/3ML
3 SOLUTION RESPIRATORY (INHALATION) EVERY 4 HOURS PRN
Qty: 360 ML | Refills: 0 | Status: SHIPPED | OUTPATIENT
Start: 2019-01-31 | End: 2019-06-27 | Stop reason: SDUPTHER

## 2019-01-31 RX ORDER — DOXYCYCLINE 100 MG/1
100 CAPSULE ORAL 2 TIMES DAILY
Qty: 20 CAPSULE | Refills: 0 | Status: SHIPPED | OUTPATIENT
Start: 2019-01-31 | End: 2019-06-27

## 2019-01-31 RX ORDER — SODIUM CHLORIDE 0.9 % (FLUSH) 0.9 %
10 SYRINGE (ML) INJECTION AS NEEDED
Status: DISCONTINUED | OUTPATIENT
Start: 2019-01-31 | End: 2019-01-31 | Stop reason: HOSPADM

## 2019-01-31 RX ORDER — DOXYCYCLINE 100 MG/1
100 CAPSULE ORAL ONCE
Status: COMPLETED | OUTPATIENT
Start: 2019-01-31 | End: 2019-01-31

## 2019-01-31 RX ORDER — PREDNISONE 20 MG/1
20 TABLET ORAL 2 TIMES DAILY
Qty: 10 TABLET | Refills: 0 | Status: SHIPPED | OUTPATIENT
Start: 2019-01-31 | End: 2019-06-27

## 2019-01-31 RX ADMIN — DOXYCYCLINE 100 MG: 100 CAPSULE ORAL at 18:42

## 2019-02-07 RX ORDER — ALBUTEROL SULFATE 2.5 MG/3ML
2.5 SOLUTION RESPIRATORY (INHALATION) EVERY 6 HOURS PRN
Qty: 90 VIAL | Refills: 5 | Status: SHIPPED | OUTPATIENT
Start: 2019-02-07 | End: 2019-12-16 | Stop reason: SDUPTHER

## 2019-04-12 ENCOUNTER — TELEPHONE (OUTPATIENT)
Dept: PULMONOLOGY | Facility: CLINIC | Age: 59
End: 2019-04-12

## 2019-04-12 NOTE — TELEPHONE ENCOUNTER
Spoke to Mrs Gonzalez and told her that I sent her medical oxygen necessity form to [facility] on 4-11-19         ----- Message from Kayley Carrero sent at 4/11/2019  2:20 PM CDT -----  This patient left a v/mail stating she needs Dr Marcos to send verification that she needs oxygen. However, she did not say where it needs to be sent.  Her # is [phone]  Thank you

## 2019-06-27 ENCOUNTER — OFFICE VISIT (OUTPATIENT)
Dept: PULMONOLOGY | Facility: CLINIC | Age: 59
End: 2019-06-27

## 2019-06-27 VITALS
HEIGHT: 63 IN | WEIGHT: 110.4 LBS | SYSTOLIC BLOOD PRESSURE: 146 MMHG | OXYGEN SATURATION: 94 % | DIASTOLIC BLOOD PRESSURE: 68 MMHG | BODY MASS INDEX: 19.56 KG/M2 | HEART RATE: 75 BPM

## 2019-06-27 DIAGNOSIS — J43.1 PANLOBULAR EMPHYSEMA (HCC): ICD-10-CM

## 2019-06-27 DIAGNOSIS — J96.11 CHRONIC RESPIRATORY FAILURE WITH HYPOXIA (HCC): Primary | Chronic | ICD-10-CM

## 2019-06-27 PROCEDURE — 99213 OFFICE O/P EST LOW 20 MIN: CPT | Performed by: INTERNAL MEDICINE

## 2019-06-27 RX ORDER — BUDESONIDE AND FORMOTEROL FUMARATE DIHYDRATE 160; 4.5 UG/1; UG/1
AEROSOL RESPIRATORY (INHALATION)
Qty: 1 INHALER | Refills: 5 | Status: SHIPPED | OUTPATIENT
Start: 2019-06-27 | End: 2019-12-16 | Stop reason: SDUPTHER

## 2019-06-27 RX ORDER — ALBUTEROL SULFATE 90 UG/1
AEROSOL, METERED RESPIRATORY (INHALATION)
Qty: 1 INHALER | Refills: 5 | Status: SHIPPED | OUTPATIENT
Start: 2019-06-27 | End: 2019-12-16 | Stop reason: SDUPTHER

## 2019-06-27 RX ORDER — IPRATROPIUM BROMIDE AND ALBUTEROL SULFATE 2.5; .5 MG/3ML; MG/3ML
3 SOLUTION RESPIRATORY (INHALATION) EVERY 4 HOURS PRN
Qty: 270 ML | Refills: 5 | Status: SHIPPED | OUTPATIENT
Start: 2019-06-27 | End: 2019-12-16 | Stop reason: SDUPTHER

## 2019-06-27 RX ORDER — HYDROXYZINE PAMOATE 50 MG/1
50 CAPSULE ORAL 2 TIMES DAILY
Refills: 0 | COMMUNITY
Start: 2019-06-07

## 2019-06-27 NOTE — PROGRESS NOTES
"This lady has pulmonary emphysema, chronic respiratory failure, persistent tobacco use.  Her breathing is about the same.  She has dyspnea on minimal exertion    ROS    Constitutional-no night sweats weight loss headaches  GI no abdominal pain nausea or diarrhea  Neuro no seizure or neurologic deficits  Musculoskeletal no deformity or joint pain   no dysuria or hematuria  Skin no rash or other lesions  All other systems reviewed and were negative except for the above.      Physical Exam  /68   Pulse 75   Ht 160 cm (63\")   Wt 50.1 kg (110 lb 6.4 oz)   SpO2 94%   BMI 19.56 kg/m²   Vital signs as above  Pupils equally round and reactive to light and accommodation, neck no JVD or adenopathy.  Cardiovascular regular rhythm and rate no murmur or gallop.  Abdomen soft no organomegaly tenderness.  Extremities no clubbing cyanosis or edema.  No cervical adenopathy.  No skin rash.  Neurologic good strength bilaterally without deficits  Dyspneic white female lungs are quiet    Impression advanced COPD with chronic respiratory failure, tobacco use    Recommendations cessation of smoking, continue present medications, return in 6 months        This document has been produced with the assistance of Dragon dictation  This document has been electronically signed by Sang Marcos MD on June 27, 2019 1:40 PM      "

## 2019-12-16 ENCOUNTER — OFFICE VISIT (OUTPATIENT)
Dept: PULMONOLOGY | Facility: CLINIC | Age: 59
End: 2019-12-16

## 2019-12-16 VITALS
DIASTOLIC BLOOD PRESSURE: 70 MMHG | BODY MASS INDEX: 22.75 KG/M2 | SYSTOLIC BLOOD PRESSURE: 120 MMHG | WEIGHT: 128.4 LBS | HEIGHT: 63 IN | OXYGEN SATURATION: 92 % | HEART RATE: 63 BPM

## 2019-12-16 DIAGNOSIS — J43.1 PANLOBULAR EMPHYSEMA (HCC): ICD-10-CM

## 2019-12-16 DIAGNOSIS — J96.11 CHRONIC RESPIRATORY FAILURE WITH HYPOXIA (HCC): Primary | Chronic | ICD-10-CM

## 2019-12-16 PROCEDURE — 99213 OFFICE O/P EST LOW 20 MIN: CPT | Performed by: INTERNAL MEDICINE

## 2019-12-16 RX ORDER — LORATADINE 10 MG/1
10 TABLET ORAL DAILY
Refills: 0 | COMMUNITY
Start: 2019-09-24 | End: 2020-01-16

## 2019-12-16 RX ORDER — MONTELUKAST SODIUM 10 MG/1
10 TABLET ORAL DAILY
Refills: 0 | COMMUNITY
Start: 2019-09-24

## 2019-12-16 NOTE — PROGRESS NOTES
"This lady has emphysema hypoxemia and persistent tobacco use.  Her breathing remains labored but is about the same    ROS    Constitutional-no night sweats weight loss headaches  GI no abdominal pain nausea or diarrhea  Neuro no seizure or neurologic deficits  Musculoskeletal no deformity or joint pain   no dysuria or hematuria  Skin no rash or other lesions  All other systems reviewed and were negative except for the above.      Physical Exam  /70   Pulse 63   Ht 160 cm (63\")   Wt 58.2 kg (128 lb 6.4 oz)   SpO2 92%   BMI 22.75 kg/m²   Vital signs as above  Pupils equally round and reactive to light and accommodation, neck no JVD or adenopathy.  Cardiovascular regular rhythm and rate no murmur or gallop.  Abdomen soft no organomegaly tenderness.  Extremities no clubbing cyanosis or edema.  No cervical adenopathy.  No skin rash.  Neurologic good strength bilaterally without deficits  Alert afebrile lungs diminished breath sounds    Impression COPD emphysema    Plan Depo-Medrol refrain from smoking, continue present medications, return in 6 months        This document has been produced with the assistance of [employer]  This document has been electronically signed by Sang Marcos MD on December 16, 2019 10:18 AM      "

## 2020-01-14 ENCOUNTER — TELEPHONE (OUTPATIENT)
Dept: PULMONOLOGY | Facility: CLINIC | Age: 60
End: 2020-01-14

## 2020-01-14 NOTE — TELEPHONE ENCOUNTER
Left message for Prabha to call me back  1-14-19  11:40          ----- Message from Jing Gonzalez sent at 1/14/2020 11:00 AM CST -----  Contact: [phone]  Isabel with Dr. Claudio Hatfield or (Nicolas) left a msg on v/m yesterday @ 4:10.     They need a pulmonary clearance for pt to have colonoscopy done. Does she need to be seen again by Priti- seen a few weeks ago    Phone ext  [phone]

## 2020-01-16 ENCOUNTER — OFFICE VISIT (OUTPATIENT)
Dept: PULMONOLOGY | Facility: CLINIC | Age: 60
End: 2020-01-16

## 2020-01-16 VITALS
OXYGEN SATURATION: 94 % | WEIGHT: 107.5 LBS | DIASTOLIC BLOOD PRESSURE: 78 MMHG | SYSTOLIC BLOOD PRESSURE: 134 MMHG | HEIGHT: 63 IN | HEART RATE: 75 BPM | BODY MASS INDEX: 19.05 KG/M2

## 2020-01-16 DIAGNOSIS — J44.1 CHRONIC OBSTRUCTIVE PULMONARY DISEASE WITH ACUTE EXACERBATION (HCC): ICD-10-CM

## 2020-01-16 DIAGNOSIS — Z01.811 PREOPERATIVE RESPIRATORY EXAMINATION: Primary | ICD-10-CM

## 2020-01-16 PROCEDURE — 96372 THER/PROPH/DIAG INJ SC/IM: CPT | Performed by: INTERNAL MEDICINE

## 2020-01-16 PROCEDURE — 99214 OFFICE O/P EST MOD 30 MIN: CPT | Performed by: INTERNAL MEDICINE

## 2020-01-16 RX ORDER — METHYLPREDNISOLONE ACETATE 80 MG/ML
80 INJECTION, SUSPENSION INTRA-ARTICULAR; INTRALESIONAL; INTRAMUSCULAR; SOFT TISSUE ONCE
Status: COMPLETED | OUTPATIENT
Start: 2020-01-16 | End: 2020-01-16

## 2020-01-16 RX ORDER — DOXYCYCLINE 100 MG/1
CAPSULE ORAL
Qty: 20 CAPSULE | Refills: 0 | Status: SHIPPED | OUTPATIENT
Start: 2020-01-16 | End: 2020-09-08

## 2020-01-16 RX ADMIN — METHYLPREDNISOLONE ACETATE 80 MG: 80 INJECTION, SUSPENSION INTRA-ARTICULAR; INTRALESIONAL; INTRAMUSCULAR; SOFT TISSUE at 11:01

## 2020-01-16 NOTE — PROGRESS NOTES
"This 59-year-old lady has emphysema and hypoxemia.  She has had a respiratory infection recently with purulent sputum.  She uses oxygen and breathing medications.  She apparently has had some lower GI bleed and will require endoscopy with anesthesia    The following portions of the patient's history were reviewed and updated as appropriate: current medications, past family history, past medical history, past social history, past surgical history and problem list.      ROS    Constitutional-no night sweats weight loss headaches  GI no  nausea or diarrhea  Neuro no seizure or neurologic deficits  Musculoskeletal no deformity or joint pain   no dysuria or hematuria  Skin no rash or other lesions  All other systems reviewed and were negative except for the above.    Physical Exam  /78   Pulse 75   Ht 160 cm (63\")   Wt 48.8 kg (107 lb 8 oz)   SpO2 94%   BMI 19.04 kg/m²   Vital signs as above  Pupils equally round and reactive to light and accommodation, neck no JVD or adenopathy.  Cardiovascular regular rhythm and rate no murmur or gallop.  Abdomen soft no organomegaly tenderness.  Extremities no clubbing cyanosis or edema.  No cervical adenopathy.  No skin rash.  Neurologic good strength bilaterally without deficits  Chronically ill-appearing white female who is dyspneic and using oxygen.  Lungs reveal diminished breath sounds and prolonged expiration    Impression emphysema with hypoxemia, lower GI bleed    Recommendations this patient is at higher risk for anesthesia and surgery, however she would probably tolerate propofol anesthesia prior to lower GI endoscopy.  Would continue oxygen during the anesthesia and procedure        This document has been produced with the assistance of Dragon dictation  This document has been electronically signed by Sang Marcos MD on January 16, 2020 10:59 AM        "

## 2020-02-10 RX ORDER — BUDESONIDE AND FORMOTEROL FUMARATE DIHYDRATE 160; 4.5 UG/1; UG/1
AEROSOL RESPIRATORY (INHALATION)
Qty: 1 INHALER | Refills: 5 | Status: SHIPPED | OUTPATIENT
Start: 2020-02-10 | End: 2020-04-14 | Stop reason: SDUPTHER

## 2020-04-14 DIAGNOSIS — R06.02 SHORTNESS OF BREATH: ICD-10-CM

## 2020-04-14 RX ORDER — IPRATROPIUM BROMIDE AND ALBUTEROL SULFATE 2.5; .5 MG/3ML; MG/3ML
3 SOLUTION RESPIRATORY (INHALATION) TAKE AS DIRECTED
Qty: 360 ML | Refills: 5 | Status: SHIPPED | OUTPATIENT
Start: 2020-04-14 | End: 2020-09-08

## 2020-04-14 RX ORDER — ALBUTEROL SULFATE 90 UG/1
AEROSOL, METERED RESPIRATORY (INHALATION)
Qty: 1 INHALER | Refills: 5 | Status: SHIPPED | OUTPATIENT
Start: 2020-04-14

## 2020-04-14 RX ORDER — BUDESONIDE AND FORMOTEROL FUMARATE DIHYDRATE 160; 4.5 UG/1; UG/1
AEROSOL RESPIRATORY (INHALATION)
Qty: 1 INHALER | Refills: 5 | Status: SHIPPED | OUTPATIENT
Start: 2020-04-14

## 2020-06-10 ENCOUNTER — OFFICE VISIT (OUTPATIENT)
Dept: PULMONOLOGY | Facility: CLINIC | Age: 60
End: 2020-06-10

## 2020-06-10 VITALS
BODY MASS INDEX: 19.14 KG/M2 | HEART RATE: 63 BPM | DIASTOLIC BLOOD PRESSURE: 75 MMHG | HEIGHT: 63 IN | OXYGEN SATURATION: 95 % | WEIGHT: 108 LBS | SYSTOLIC BLOOD PRESSURE: 140 MMHG

## 2020-06-10 DIAGNOSIS — J43.1 PANLOBULAR EMPHYSEMA (HCC): Primary | ICD-10-CM

## 2020-06-10 PROCEDURE — 99213 OFFICE O/P EST LOW 20 MIN: CPT | Performed by: INTERNAL MEDICINE

## 2020-06-10 RX ORDER — OMEPRAZOLE 20 MG/1
1 CAPSULE, DELAYED RELEASE ORAL DAILY
COMMUNITY
Start: 2020-06-08

## 2020-06-10 NOTE — PROGRESS NOTES
"This lady has emphysema with chronic respiratory failure hypoxemia.  She continues to smoke.  Her breathing is labored but about the same.  She denies chest pain hemoptysis or purulent sputum    ROS    Constitutional-no night sweats weight loss headaches  GI no abdominal pain nausea or diarrhea  Neuro no seizure or neurologic deficits  Musculoskeletal no deformity or joint pain   no dysuria or hematuria  Skin no rash or other lesions  All other systems reviewed and were negative except for the above.      Physical Exam  /75   Pulse 63   Ht 160 cm (63\")   Wt 49 kg (108 lb)   SpO2 95%   BMI 19.13 kg/m²   Vital signs as above  Pupils equally round and reactive to light and accommodation, neck no JVD or adenopathy.  Cardiovascular regular rhythm and rate no murmur or gallop.  Abdomen soft no organomegaly tenderness.  Extremities no clubbing cyanosis or edema.  No cervical adenopathy.  No skin rash.  Neurologic good strength bilaterally without deficits  Alert afebrile dyspneic white female lungs are quiet without wheeze    Impression emphysema, persistent tobacco use, chronic respiratory failure    Plan continue present medications, refrain from smoking, return in 3 months        This document has been produced with the assistance of Dragon dictation  This document has been electronically signed by Sang Marcos MD on Marie 10, 2020 10:40      "

## 2020-07-06 RX ORDER — BUDESONIDE AND FORMOTEROL FUMARATE DIHYDRATE 160; 4.5 UG/1; UG/1
AEROSOL RESPIRATORY (INHALATION)
Qty: 10.2 G | OUTPATIENT
Start: 2020-07-06

## 2020-07-12 ENCOUNTER — APPOINTMENT (OUTPATIENT)
Dept: CT IMAGING | Facility: HOSPITAL | Age: 60
End: 2020-07-12

## 2020-07-12 ENCOUNTER — HOSPITAL ENCOUNTER (EMERGENCY)
Facility: HOSPITAL | Age: 60
Discharge: HOME | End: 2020-07-12
Attending: STUDENT IN AN ORGANIZED HEALTH CARE EDUCATION/TRAINING PROGRAM | Admitting: STUDENT IN AN ORGANIZED HEALTH CARE EDUCATION/TRAINING PROGRAM

## 2020-07-12 VITALS
SYSTOLIC BLOOD PRESSURE: 173 MMHG | WEIGHT: 115 LBS | RESPIRATION RATE: 18 BRPM | BODY MASS INDEX: 20.38 KG/M2 | HEART RATE: 64 BPM | TEMPERATURE: 97.2 F | DIASTOLIC BLOOD PRESSURE: 77 MMHG | OXYGEN SATURATION: 100 % | HEIGHT: 63 IN

## 2020-07-12 DIAGNOSIS — N20.0 KIDNEY STONE: Primary | ICD-10-CM

## 2020-07-12 DIAGNOSIS — R31.9 HEMATURIA, UNSPECIFIED TYPE: ICD-10-CM

## 2020-07-12 DIAGNOSIS — R10.9 ABDOMINAL PAIN, UNSPECIFIED ABDOMINAL LOCATION: ICD-10-CM

## 2020-07-12 LAB
ANION GAP SERPL CALCULATED.3IONS-SCNC: 10 MMOL/L (ref 5–15)
BACTERIA UR QL AUTO: ABNORMAL /HPF
BASOPHILS # BLD AUTO: 0.05 10*3/MM3 (ref 0–0.2)
BASOPHILS NFR BLD AUTO: 0.5 % (ref 0–1.5)
BILIRUB UR QL STRIP: NEGATIVE
BUN SERPL-MCNC: 16 MG/DL (ref 8–23)
BUN/CREAT SERPL: 16.5 (ref 7–25)
CALCIUM SPEC-SCNC: 9.4 MG/DL (ref 8.6–10.5)
CHLORIDE SERPL-SCNC: 104 MMOL/L (ref 98–107)
CLARITY UR: CLEAR
CO2 SERPL-SCNC: 26 MMOL/L (ref 22–29)
COLOR UR: YELLOW
CREAT SERPL-MCNC: 0.97 MG/DL (ref 0.57–1)
DEPRECATED RDW RBC AUTO: 45.3 FL (ref 37–54)
EOSINOPHIL # BLD AUTO: 0.06 10*3/MM3 (ref 0–0.4)
EOSINOPHIL NFR BLD AUTO: 0.6 % (ref 0.3–6.2)
ERYTHROCYTE [DISTWIDTH] IN BLOOD BY AUTOMATED COUNT: 13.3 % (ref 12.3–15.4)
GFR SERPL CREATININE-BSD FRML MDRD: 58 ML/MIN/1.73
GLUCOSE SERPL-MCNC: 133 MG/DL (ref 65–99)
GLUCOSE UR STRIP-MCNC: NEGATIVE MG/DL
HCT VFR BLD AUTO: 41.3 % (ref 34–46.6)
HGB BLD-MCNC: 14 G/DL (ref 12–15.9)
HGB UR QL STRIP.AUTO: ABNORMAL
HOLD SPECIMEN: NORMAL
HYALINE CASTS UR QL AUTO: ABNORMAL /LPF
IMM GRANULOCYTES # BLD AUTO: 0.03 10*3/MM3 (ref 0–0.05)
IMM GRANULOCYTES NFR BLD AUTO: 0.3 % (ref 0–0.5)
KETONES UR QL STRIP: NEGATIVE
LEUKOCYTE ESTERASE UR QL STRIP.AUTO: NEGATIVE
LYMPHOCYTES # BLD AUTO: 1.48 10*3/MM3 (ref 0.7–3.1)
LYMPHOCYTES NFR BLD AUTO: 13.8 % (ref 19.6–45.3)
MCH RBC QN AUTO: 31.4 PG (ref 26.6–33)
MCHC RBC AUTO-ENTMCNC: 33.9 G/DL (ref 31.5–35.7)
MCV RBC AUTO: 92.6 FL (ref 79–97)
MONOCYTES # BLD AUTO: 0.67 10*3/MM3 (ref 0.1–0.9)
MONOCYTES NFR BLD AUTO: 6.2 % (ref 5–12)
MUCOUS THREADS URNS QL MICRO: ABNORMAL /HPF
NEUTROPHILS NFR BLD AUTO: 78.6 % (ref 42.7–76)
NEUTROPHILS NFR BLD AUTO: 8.46 10*3/MM3 (ref 1.7–7)
NITRITE UR QL STRIP: NEGATIVE
NRBC BLD AUTO-RTO: 0 /100 WBC (ref 0–0.2)
PH UR STRIP.AUTO: 6 [PH] (ref 5–9)
PLATELET # BLD AUTO: 272 10*3/MM3 (ref 140–450)
PMV BLD AUTO: 9.3 FL (ref 6–12)
POTASSIUM SERPL-SCNC: 4.1 MMOL/L (ref 3.5–5.2)
PROT UR QL STRIP: ABNORMAL
RBC # BLD AUTO: 4.46 10*6/MM3 (ref 3.77–5.28)
RBC # UR: ABNORMAL /HPF
REF LAB TEST METHOD: ABNORMAL
SODIUM SERPL-SCNC: 140 MMOL/L (ref 136–145)
SP GR UR STRIP: 1.02 (ref 1–1.03)
SQUAMOUS #/AREA URNS HPF: ABNORMAL /HPF
UROBILINOGEN UR QL STRIP: ABNORMAL
WBC # BLD AUTO: 10.75 10*3/MM3 (ref 3.4–10.8)
WBC CASTS #/AREA URNS LPF: ABNORMAL /LPF
WBC UR QL AUTO: ABNORMAL /HPF

## 2020-07-12 PROCEDURE — 81001 URINALYSIS AUTO W/SCOPE: CPT | Performed by: STUDENT IN AN ORGANIZED HEALTH CARE EDUCATION/TRAINING PROGRAM

## 2020-07-12 PROCEDURE — 96374 THER/PROPH/DIAG INJ IV PUSH: CPT

## 2020-07-12 PROCEDURE — 99284 EMERGENCY DEPT VISIT MOD MDM: CPT

## 2020-07-12 PROCEDURE — 80048 BASIC METABOLIC PNL TOTAL CA: CPT | Performed by: STUDENT IN AN ORGANIZED HEALTH CARE EDUCATION/TRAINING PROGRAM

## 2020-07-12 PROCEDURE — 96375 TX/PRO/DX INJ NEW DRUG ADDON: CPT

## 2020-07-12 PROCEDURE — 25010000002 ONDANSETRON PER 1 MG: Performed by: STUDENT IN AN ORGANIZED HEALTH CARE EDUCATION/TRAINING PROGRAM

## 2020-07-12 PROCEDURE — 74176 CT ABD & PELVIS W/O CONTRAST: CPT

## 2020-07-12 PROCEDURE — 25010000002 KETOROLAC TROMETHAMINE PER 15 MG: Performed by: STUDENT IN AN ORGANIZED HEALTH CARE EDUCATION/TRAINING PROGRAM

## 2020-07-12 PROCEDURE — 85025 COMPLETE CBC W/AUTO DIFF WBC: CPT | Performed by: STUDENT IN AN ORGANIZED HEALTH CARE EDUCATION/TRAINING PROGRAM

## 2020-07-12 RX ORDER — ONDANSETRON 2 MG/ML
4 INJECTION INTRAMUSCULAR; INTRAVENOUS ONCE
Status: COMPLETED | OUTPATIENT
Start: 2020-07-12 | End: 2020-07-12

## 2020-07-12 RX ORDER — KETOROLAC TROMETHAMINE 10 MG/1
10 TABLET, FILM COATED ORAL EVERY 6 HOURS PRN
Qty: 12 TABLET | Refills: 0 | Status: SHIPPED | OUTPATIENT
Start: 2020-07-12

## 2020-07-12 RX ORDER — KETOROLAC TROMETHAMINE 30 MG/ML
20 INJECTION, SOLUTION INTRAMUSCULAR; INTRAVENOUS ONCE
Status: COMPLETED | OUTPATIENT
Start: 2020-07-12 | End: 2020-07-12

## 2020-07-12 RX ADMIN — KETOROLAC TROMETHAMINE 20 MG: 30 INJECTION, SOLUTION INTRAMUSCULAR at 15:45

## 2020-07-12 RX ADMIN — SODIUM CHLORIDE, POTASSIUM CHLORIDE, SODIUM LACTATE AND CALCIUM CHLORIDE 1000 ML: 600; 310; 30; 20 INJECTION, SOLUTION INTRAVENOUS at 15:45

## 2020-07-12 RX ADMIN — ONDANSETRON 4 MG: 2 INJECTION INTRAMUSCULAR; INTRAVENOUS at 15:45

## 2020-07-12 NOTE — ED PROVIDER NOTES
"Subjective   60-year-old female presents to the ER chief complaint of suprapubic and left lower quadrant abdominal pain for the last 4 hours.  She says it is sharp/stabbing/colicky in nature.  The pain has subsided at the moment.  She has a history of kidney stones reports that the pain is identical today as it was before.  She has had some nausea and vomiting.  No real appetite.  Denies fevers, but endorses chills and some dysuria.  Also reports that her left back is hurting.          Review of Systems   Constitutional: Positive for appetite change and chills. Negative for activity change, diaphoresis, fatigue and fever.   HENT: Negative for congestion and rhinorrhea.    Respiratory: Negative for cough, shortness of breath and wheezing.    Cardiovascular: Negative for chest pain, palpitations and leg swelling.   Gastrointestinal: Positive for abdominal pain, nausea and vomiting. Negative for diarrhea.   Genitourinary: Positive for dysuria and hematuria. Negative for difficulty urinating, flank pain, frequency and urgency.   Musculoskeletal: Positive for back pain.   Skin: Negative for color change and rash.   Neurological: Negative for dizziness and headaches.   Psychiatric/Behavioral: Negative for agitation. The patient is not nervous/anxious.        Past Medical History:   Diagnosis Date   • COPD (chronic obstructive pulmonary disease) (CMS/HCC)    • Emphysema of lung (CMS/HCC)    • Emphysema of lung (CMS/HCC)    • Hypertension    • SOB (shortness of breath)    • Tobacco use    • Vaginal bleeding        Allergies   Allergen Reactions   • Amitriptyline Palpitations     Pt reports feeling \"like heart is beating out of chest\"   • Nortriptyline GI Intolerance   • Tricyclic Antidepressants Palpitations     Heart racing   • Codeine Nausea Only   • Tylenol [Acetaminophen] Nausea Only       Past Surgical History:   Procedure Laterality Date   • CYSTOSCOPY, RETROGRADE PYELOGRAM AND STENT INSERTION Right 7/18/2018    " "Procedure: CYSTOSCOPY RETROGRADE PYELOGRAM AND STENT INSERTION;  Surgeon: Jorge Negro MD;  Location: [facility];  Service: Urology   • HYSTERECTOMY      Ovary Preserv, age 16   • NECK SURGERY     • US GUIDED FINE NEEDLE ASPIRATION  6/7/2018       Family History   Family history unknown: Yes       Social History     Socioeconomic History   • Marital status: Single     Spouse name: Not on file   • Number of children: Not on file   • Years of education: Not on file   • Highest education level: Not on file   Tobacco Use   • Smoking status: Current Every Day Smoker     Packs/day: 0.25     Years: 48.00     Pack years: 12.00     Types: Cigarettes   • Smokeless tobacco: Never Used   Substance and Sexual Activity   • Alcohol use: No   • Drug use: No   • Sexual activity: Defer           Objective    Vitals:    07/12/20 1532 07/12/20 1601 07/12/20 1645 07/12/20 1701   BP: (!) 188/87 171/77  147/69   BP Location: Left arm      Patient Position: Sitting      Pulse: 63 58 66 64   Resp: 16   16   SpO2: 100% 100% 100% 100%   Weight: 52.2 kg (115 lb)      Height: 160 cm (63\")          Physical Exam   Constitutional: She is oriented to person, place, and time. She appears well-developed and well-nourished. She is active.  Non-toxic appearance. She has a sickly appearance. She does not appear ill. No distress.   HENT:   Head: Normocephalic.   Right Ear: External ear normal.   Left Ear: External ear normal.   Mouth/Throat: Mucous membranes are normal.   Eyes: Conjunctivae are normal.   Cardiovascular: Normal rate.   Pulmonary/Chest: Effort normal. No accessory muscle usage. No respiratory distress. She has no decreased breath sounds. She has no wheezes. She exhibits no tenderness.   Abdominal: Soft. Bowel sounds are normal. There is tenderness in the suprapubic area and left lower quadrant. There is no rigidity.   Neurological: She is alert and oriented to person, place, and time. She is not disoriented. No cranial nerve deficit " (grossly intact). GCS eye subscore is 4. GCS verbal subscore is 5. GCS motor subscore is 6.   Skin: Skin is warm and dry. Capillary refill takes less than 2 seconds. She is not diaphoretic.   Psychiatric: She has a normal mood and affect. Her behavior is normal.   Nursing note and vitals reviewed.      Procedures           ED Course      Results for orders placed or performed during the hospital encounter of 07/12/20   Basic Metabolic Panel   Result Value Ref Range    Glucose 133 (H) 65 - 99 mg/dL    BUN 16 8 - 23 mg/dL    Creatinine 0.97 0.57 - 1.00 mg/dL    Sodium 140 136 - 145 mmol/L    Potassium 4.1 3.5 - 5.2 mmol/L    Chloride 104 98 - 107 mmol/L    CO2 26.0 22.0 - 29.0 mmol/L    Calcium 9.4 8.6 - 10.5 mg/dL    eGFR Non African Amer 58 (L) >60 mL/min/1.73    BUN/Creatinine Ratio 16.5 7.0 - 25.0    Anion Gap 10.0 5.0 - 15.0 mmol/L   Urinalysis With Culture If Indicated - Urine, Clean Catch   Result Value Ref Range    Color, UA Yellow Yellow, Straw, Dark Yellow, Dimple    Appearance, UA Clear Clear    pH, UA 6.0 5.0 - 9.0    Specific Gravity, UA 1.020 1.003 - 1.030    Glucose, UA Negative Negative    Ketones, UA Negative Negative    Bilirubin, UA Negative Negative    Blood, UA Small (1+) (A) Negative    Protein,  mg/dL (2+) (A) Negative    Leuk Esterase, UA Negative Negative    Nitrite, UA Negative Negative    Urobilinogen, UA 0.2 E.U./dL 0.2 - 1.0 E.U./dL   CBC Auto Differential   Result Value Ref Range    WBC 10.75 3.40 - 10.80 10*3/mm3    RBC 4.46 3.77 - 5.28 10*6/mm3    Hemoglobin 14.0 12.0 - 15.9 g/dL    Hematocrit 41.3 34.0 - 46.6 %    MCV 92.6 79.0 - 97.0 fL    MCH 31.4 26.6 - 33.0 pg    MCHC 33.9 31.5 - 35.7 g/dL    RDW 13.3 12.3 - 15.4 %    RDW-SD 45.3 37.0 - 54.0 fl    MPV 9.3 6.0 - 12.0 fL    Platelets 272 140 - 450 10*3/mm3    Neutrophil % 78.6 (H) 42.7 - 76.0 %    Lymphocyte % 13.8 (L) 19.6 - 45.3 %    Monocyte % 6.2 5.0 - 12.0 %    Eosinophil % 0.6 0.3 - 6.2 %    Basophil % 0.5 0.0 - 1.5 %     Immature Grans % 0.3 0.0 - 0.5 %    Neutrophils, Absolute 8.46 (H) 1.70 - 7.00 10*3/mm3    Lymphocytes, Absolute 1.48 0.70 - 3.10 10*3/mm3    Monocytes, Absolute 0.67 0.10 - 0.90 10*3/mm3    Eosinophils, Absolute 0.06 0.00 - 0.40 10*3/mm3    Basophils, Absolute 0.05 0.00 - 0.20 10*3/mm3    Immature Grans, Absolute 0.03 0.00 - 0.05 10*3/mm3    nRBC 0.0 0.0 - 0.2 /100 WBC   Urinalysis, Microscopic Only - Urine, Clean Catch   Result Value Ref Range    RBC, UA 6-12 (A) None Seen /HPF    WBC, UA 3-5 None Seen, 0-2, 3-5 /HPF    Bacteria, UA 2+ (A) None Seen /HPF    Squamous Epithelial Cells, UA 3-5 (A) None Seen, 0-2 /HPF    Hyaline Casts, UA 0-2 None Seen /LPF    WBC Casts 0-2 None Seen /LPF    Mucus, UA Moderate/2+ (A) None Seen, Trace /HPF    Methodology Manual Light Microscopy    Gold Top - SST   Result Value Ref Range    Extra Tube Hold for add-ons.      CT Abdomen Pelvis Without Contrast   Final Result   Conclusion:   5 mm calculus at or just proximal to the left ureterovesicular   junction.   Minimal to moderate left hydronephrosis and hydroureter with   minimal perinephric and periureteral stranding.   7 mm nonobstructive left renal calculus.   Unchanged somewhat small irregular spleen with 2.3 cm coarse   ossification or calcification just superior and anterior to the   spleen, these changes may be secondary to remote trauma.   Diverticulosis of the colon.   Hysterectomy.   Chronic obstructive pulmonary disease and bibasilar linear   scarring.      [id number]      Electronically signed by:  Yahir Lynn MD  7/12/2020 4:55 PM CDT   Workstation: [id number]                MDM  Number of Diagnoses or Management Options  Abdominal pain, unspecified abdominal location: new and requires workup  Hematuria, unspecified type: new and requires workup  Kidney stone: new and requires workup  Diagnosis management comments: Patient placed in bed 5 and evaluated by me. Vital signs are stable, afebrile.  Labs unremarkable.  Blood in the  urine, but nitrite and leukoesterase negative.  White count is normal.  CT abdomen pelvis showed 5 mm stone at the left ureterovesicular junction.  Patient received Toradol, Zofran, IVF bolus in the ER.  On reevaluation, patient is alert and resting comfortably.  Her belly pain has improved.  I discussed the results of the emergency department evaluation with the patient.  I recommended primary care and urology follow-up.  Return precautions discussed.  Patient discharged with a prescription of Toradol and a urine strainer.       Amount and/or Complexity of Data Reviewed  Clinical lab tests: ordered and reviewed  Tests in the radiology section of CPT®: reviewed and ordered  Tests in the medicine section of CPT®: reviewed and ordered  Review and summarize past medical records: yes  Independent visualization of images, tracings, or specimens: yes    Patient Progress  Patient progress: improved      Final diagnoses:   Kidney stone   Abdominal pain, unspecified abdominal location   Hematuria, unspecified type            Christos Fernandes MD  Resident  07/12/20 [id number]

## 2020-07-12 NOTE — ED NOTES
Patient was placed in face mask during first look triage.  Patient was wearing a face mask throughout encounter.  This RN wore personal protective equipment throughout the encounter.  Hand hygiene was performed before and after patient encounter.       Latoya Sunshine, RN  07/12/20 1530

## 2020-07-12 NOTE — ED NOTES
Patient arrived via EMS for complaints of flank pain and lower abdominal pain. Patient states she vomited x1  Prior to arrival.      Latoya Sunshine RN  07/12/20 [id number]

## 2020-08-31 DIAGNOSIS — J43.1 PANLOBULAR EMPHYSEMA (HCC): Primary | ICD-10-CM

## 2020-09-08 ENCOUNTER — OFFICE VISIT (OUTPATIENT)
Dept: PULMONOLOGY | Facility: CLINIC | Age: 60
End: 2020-09-08

## 2020-09-08 ENCOUNTER — HOSPITAL ENCOUNTER (OUTPATIENT)
Dept: GENERAL RADIOLOGY | Facility: HOSPITAL | Age: 60
Discharge: HOME | End: 2020-09-08
Admitting: INTERNAL MEDICINE

## 2020-09-08 ENCOUNTER — PROCEDURE VISIT (OUTPATIENT)
Dept: PULMONOLOGY | Facility: CLINIC | Age: 60
End: 2020-09-08

## 2020-09-08 VITALS
SYSTOLIC BLOOD PRESSURE: 124 MMHG | WEIGHT: 110 LBS | OXYGEN SATURATION: 99 % | DIASTOLIC BLOOD PRESSURE: 70 MMHG | BODY MASS INDEX: 19.49 KG/M2 | HEART RATE: 66 BPM

## 2020-09-08 VITALS
SYSTOLIC BLOOD PRESSURE: 124 MMHG | DIASTOLIC BLOOD PRESSURE: 70 MMHG | HEART RATE: 66 BPM | OXYGEN SATURATION: 99 % | BODY MASS INDEX: 19.49 KG/M2 | WEIGHT: 110 LBS

## 2020-09-08 DIAGNOSIS — J44.9 CHRONIC OBSTRUCTIVE PULMONARY DISEASE, UNSPECIFIED COPD TYPE (HCC): Primary | ICD-10-CM

## 2020-09-08 DIAGNOSIS — J44.9 STAGE 3 SEVERE COPD BY GOLD CLASSIFICATION (HCC): ICD-10-CM

## 2020-09-08 DIAGNOSIS — R53.81 PHYSICAL DECONDITIONING: ICD-10-CM

## 2020-09-08 DIAGNOSIS — J43.1 PANLOBULAR EMPHYSEMA (HCC): ICD-10-CM

## 2020-09-08 DIAGNOSIS — J96.11 CHRONIC RESPIRATORY FAILURE WITH HYPOXIA (HCC): Primary | Chronic | ICD-10-CM

## 2020-09-08 DIAGNOSIS — R06.02 SHORTNESS OF BREATH: ICD-10-CM

## 2020-09-08 DIAGNOSIS — F17.210 CIGARETTE NICOTINE DEPENDENCE, UNCOMPLICATED: ICD-10-CM

## 2020-09-08 PROBLEM — I27.20 PULMONARY HYPERTENSION (HCC): Status: ACTIVE | Noted: 2020-09-08

## 2020-09-08 PROCEDURE — 94727 GAS DIL/WSHOT DETER LNG VOL: CPT | Performed by: INTERNAL MEDICINE

## 2020-09-08 PROCEDURE — 71046 X-RAY EXAM CHEST 2 VIEWS: CPT

## 2020-09-08 PROCEDURE — 99214 OFFICE O/P EST MOD 30 MIN: CPT | Performed by: INTERNAL MEDICINE

## 2020-09-08 PROCEDURE — 94729 DIFFUSING CAPACITY: CPT | Performed by: INTERNAL MEDICINE

## 2020-09-08 PROCEDURE — 94010 BREATHING CAPACITY TEST: CPT | Performed by: INTERNAL MEDICINE

## 2020-09-08 RX ORDER — IPRATROPIUM BROMIDE AND ALBUTEROL SULFATE 2.5; .5 MG/3ML; MG/3ML
3 SOLUTION RESPIRATORY (INHALATION)
Qty: 360 ML | Refills: 5 | Status: SHIPPED | OUTPATIENT
Start: 2020-09-08

## 2020-09-08 NOTE — PROGRESS NOTES
Pulmonary Office Follow-up    Subjective     Prabha Gonzalez is seen today at the office for   Chief Complaint   Patient presents with   • Emphysema     Former Priti patient         History of Present Illness  Prabha Gonzalez is a 60 y.o. female with a PMH significant for COPD, chronic hypoxemic respiratory failure, tobacco use, pulmonary hypertension, diastolic dysfunction, and hypertension who presents for evaluation of chronic hypoxemic respiratory failure.    9/8/2020: Pt states she is here for a check up. She was previously seen by Dr. Marcos. Pt takes duonebs 2x/ day, Symbicort BID, and albuterol MDI ~2-3x/d. She complains of STOKES on minimal exertion which is relieved with albuterol. Pt also has oxygen 3lpm which she uses prn depending on her dyspnea. She does wear at night.  Her granddaughter wants to know more information about [employer] as she thinks she would benefit from a portable concentrator.  She currently is serviced by [employer].  Pt does not have a functioning pulse ox. She admits to a chronic cough productive creamy sputum but she denies hemoptysis. She has some wheeze with exertion. Pt does have occasional leg swelling but she denies wt changes.  She states that she has difficulty wearing a mask and her granddaughter reports they have to tell people that she is unable to breathe her mask when she goes in public.  She does have reflux but is mostly controlled with her omeprazole.  Pt does still smoke <0.5ppd and while she knows she needs to quit she does not want to.      Tobacco use history:  Type: cigarettes  Amount: 3 ppd  Duration: 40 years  Cessation: N/A   Willing to quit: No      Review of Systems: History obtained from chart review and the patient.  Review of Systems   Constitutional: Positive for appetite change and fatigue. Negative for fever and unexpected weight change.   HENT: Positive for postnasal drip and trouble swallowing.    Respiratory: Positive for cough, shortness of breath  and wheezing.    Cardiovascular: Negative for chest pain.   Gastrointestinal: Positive for nausea and vomiting.        Heartburn   Genitourinary: Positive for frequency.   Musculoskeletal: Positive for arthralgias and back pain.   Neurological: Positive for dizziness, weakness and headaches.   Psychiatric/Behavioral: Positive for decreased concentration. The patient is nervous/anxious.      As described in the HPI. Otherwise, remainder of ROS (14 systems) were negative.    Patient Active Problem List   Diagnosis   • Panlobular emphysema (CMS/HCC)   • Stage 3 severe COPD by GOLD classification (CMS/HCC)   • Smoking   • Acute bronchitis   • Anxiety   • Chest pain   • Diarrhea   • Diastolic dysfunction   • HLD (hyperlipidemia)   • HTN (hypertension)   • Lipoma of other skin and subcutaneous tissue   • MVP (mitral valve prolapse)   • Nausea and vomiting   • Tobacco abuse   • Renal colic on right side   • Hydronephrosis   • Chronic respiratory failure with hypoxia (CMS/HCC)   • Cigarette nicotine dependence, uncomplicated   • Pulmonary hypertension (CMS/HCC)   • Physical deconditioning         Current Outpatient Medications:   •  albuterol sulfate HFA (Ventolin HFA) 108 (90 Base) MCG/ACT inhaler, 2 puffs every 4 hours as needed for breathing, Disp: 1 inhaler, Rfl: 5  •  aspirin 81 MG EC tablet, Take 81 mg by mouth Daily., Disp: , Rfl:   •  atenolol (TENORMIN) 25 MG tablet, Take 25 mg by mouth Daily., Disp: , Rfl:   •  budesonide-formoterol (SYMBICORT) 160-4.5 MCG/ACT inhaler, 2 puffs twice a day, Disp: 1 inhaler, Rfl: 5  •  clopidogrel (PLAVIX) 75 MG tablet, Take 75 mg by mouth Daily., Disp: , Rfl:   •  escitalopram (LEXAPRO) 10 MG tablet, Take 10 mg by mouth Every Morning., Disp: , Rfl:   •  hydrOXYzine pamoate (VISTARIL) 50 MG capsule, Take 50 mg by mouth 2 (Two) Times a Day., Disp: , Rfl: 0  •  ipratropium-albuterol (DUO-NEB) 0.5-2.5 mg/3 ml nebulizer, Take 3 mL by nebulization 4 (Four) Times a Day., Disp: 360 mL,  "Rfl: 5  •  MAGNESIUM-OXIDE 400 (241.3 Mg) MG tablet tablet, Take 400 mg by mouth Every Night., Disp: , Rfl: 0  •  Misc. Devices (STRAINER/STAINLESS STEEL/2.5\") misc, 1 each Daily., Disp: 1 each, Rfl: 0  •  nitroglycerin (NITROSTAT) 0.4 MG SL tablet, Place 0.4 mg under the tongue Every 5 (Five) Minutes As Needed for Chest Pain. Take no more than 3 doses in 15 minutes., Disp: , Rfl:   •  omeprazole (priLOSEC) 20 MG capsule, Take 1 capsule by mouth Daily., Disp: , Rfl:   •  Pediatric Multiple Vitamins (THERA MULTI-VITAMIN PO), Take 1 tablet by mouth Daily., Disp: , Rfl:   •  ramipril (ALTACE) 2.5 MG capsule, Take 1 capsule by mouth Daily. (Patient taking differently: Take 1.25 mg by mouth Daily.), Disp: 30 capsule, Rfl: 0  •  cycloSPORINE (RESTASIS) 0.05 % ophthalmic emulsion, Administer 1 drop to both eyes 2 (Two) Times a Day., Disp: , Rfl:   •  ketorolac (TORADOL) 10 MG tablet, Take 1 tablet by mouth Every 6 (Six) Hours As Needed for Moderate Pain ., Disp: 12 tablet, Rfl: 0  •  montelukast (SINGULAIR) 10 MG tablet, Take 10 mg by mouth Daily., Disp: , Rfl: 0  •  Omega-3 Fatty Acids (OMEGA 3 PO), Take  by mouth., Disp: , Rfl:     Allergies   Allergen Reactions   • Amitriptyline Palpitations     Pt reports feeling \"like heart is beating out of chest\"   • Nortriptyline GI Intolerance   • Tricyclic Antidepressants Palpitations     Heart racing   • Codeine Nausea Only   • Tylenol [Acetaminophen] Nausea Only       Past Medical History:   Diagnosis Date   • COPD (chronic obstructive pulmonary disease) (CMS/HCC)    • Emphysema of lung (CMS/HCC)    • Emphysema of lung (CMS/HCC)    • Hypertension    • SOB (shortness of breath)    • Tobacco use    • Vaginal bleeding      Past Surgical History:   Procedure Laterality Date   • CYSTOSCOPY, RETROGRADE PYELOGRAM AND STENT INSERTION Right 7/18/2018    Procedure: CYSTOSCOPY RETROGRADE PYELOGRAM AND STENT INSERTION;  Surgeon: Jorge Negro MD;  Location: [facility];  Service: Urology "   • HYSTERECTOMY      Ovary Preserv, age 16   • NECK SURGERY     • US GUIDED FINE NEEDLE ASPIRATION  6/7/2018     Social History     Socioeconomic History   • Marital status: Single     Spouse name: Not on file   • Number of children: Not on file   • Years of education: Not on file   • Highest education level: Not on file   Tobacco Use   • Smoking status: Current Every Day Smoker     Packs/day: 0.25     Years: 48.00     Pack years: 12.00     Types: Cigarettes   • Smokeless tobacco: Never Used   Substance and Sexual Activity   • Alcohol use: No   • Drug use: No   • Sexual activity: Defer     Family History   Family history unknown: Yes          Objective     Blood pressure 124/70, pulse 66, weight 49.9 kg (110 lb), SpO2 99 %.  Physical Exam   Constitutional: She is oriented to person, place, and time. Vital signs are normal. She appears well-developed and well-nourished.   HENT:   Head: Normocephalic and atraumatic.   Masked   Eyes: Pupils are equal, round, and reactive to light. Conjunctivae, EOM and lids are normal.   Neck: Trachea normal and normal range of motion. No tracheal tenderness present. No thyroid mass present.   Cardiovascular: Normal rate, regular rhythm and normal heart sounds. PMI is not displaced. Exam reveals no gallop.   No murmur heard.  Pulmonary/Chest: Effort normal and breath sounds normal. No respiratory distress. She has no decreased breath sounds. She has no wheezes. She has no rhonchi. She exhibits deformity (barrel chest). She exhibits no tenderness.   Abdominal: Soft. Normal appearance and bowel sounds are normal. There is no hepatomegaly. There is no tenderness.   Musculoskeletal:   Normal gait, no extremity edema     Vascular Status -  Her right foot exhibits no edema. Her left foot exhibits no edema.  Lymphadenopathy:        Head (right side): No submandibular adenopathy present.        Head (left side): No submandibular adenopathy present.     She has no cervical adenopathy.         Right: No supraclavicular adenopathy present.        Left: No supraclavicular adenopathy present.   Neurological: She is alert and oriented to person, place, and time.   Skin: Skin is warm and dry. No rash noted. No cyanosis. Nails show no clubbing.   Psychiatric: She has a normal mood and affect. Her speech is normal and behavior is normal. Judgment normal.   Nursing note and vitals reviewed.      PFTs: 9/8/20 (independently reviewed and interpreted by me)  Ratio 35  FVC 2.26/ 75%  FEV1 0.8/ 33%  TLC 4.73/ 96%  DLCO 5.91/ 25%  Severe obstruction.  Normal lung volumes but evidence of air trapping.  Severely reduced diffusing capacity.  No comparative data available.    Radiology (independently reviewed and interpreted by me): CXR 9/8/20 showed hyperinflation consistent with emphysema, stable left-sided calcified granuloma    CT PA 2/19/18: No PE, centrilobular emphysema    TTE 2/20/18:  · Left ventricular systolic function is normal. Estimated EF = 65%.  · Left ventricular diastolic dysfunction (grade I) consistent with impaired relaxation.  · RVSP 48.8 mmHg     Assessment/Plan     Prabha was seen today for emphysema.    Diagnoses and all orders for this visit:    Chronic respiratory failure with hypoxia (CMS/[facility])  -     Pulmonary Function Test    Stage 3 severe COPD by GOLD classification (CMS/[facility])    Cigarette nicotine dependence, uncomplicated    Physical deconditioning    Shortness of breath  -     ipratropium-albuterol (DUO-NEB) 0.5-2.5 mg/3 ml nebulizer; Take 3 mL by nebulization 4 (Four) Times a Day.         Discussion/ Recommendations:   PFTs are consistent with severe obstruction.  I personally reviewed her chest x-ray which was consistent with underlying emphysema.  She is already on triple therapy but is not maximizing her JASON at this time.  I did offer her a transition to a Lama but she preferred to stay with her nebulizer.  Otherwise, I encouraged her to wear her oxygen around-the-clock and stressed the  importance of complete tobacco cessation.  I also recommended that she exercise on a regular basis.    -Continue Symbicort 160 twice daily  -Counseled to use duo nebs 4 times daily for max benefit  -Use albuterol as needed for dyspnea or wheeze  -Use supplemental oxygen ATC.  Recommended that they contact [facility] to inquire if she would be able to get a portable concentrator if she transitioned her service from [employer]grass.  -Encouraged regular comprehensive exercise  -Prabha Gonzalez  reports that she has been smoking cigarettes. She has a 12.00 pack-year smoking history. She has never used smokeless tobacco.. I have educated her on the risk of diseases from using tobacco products such as cancer, COPD and heart diease. I advised her to quit and she is not willing to quit. I spent 2 minutes counseling the patient.  -Up-to-date with pneumococcal vaccine.  Recommend annual influenza vaccination.    Counseled on the importance of mask wearing in public when social distancing is not possible.  Advised that if she is unable to tolerate wearing a mask in the settings, then she should avoid the settings.    Patient's Body mass index is 19.49 kg/m². BMI is within normal parameters. No follow-up required..           Return in about 3 months (around 12/8/2020) for Recheck COPD.      Thank you for allowing me to participate in the care of Prabha Gonzalez. Please do not hesitate to contact me with any questions.         This document has been electronically signed by Soraya Lynn MD on September 8, 2020 09:29      Dictated using Dragon

## 2020-09-08 NOTE — PROCEDURES
Pulmonary Function Test  Performed by: Soraya Lynn MD  Authorized by: Soraya Lynn MD      Pre Drug    FVC: 75%   FEV1: 33%   FEV1/FVC: 35%   T%   RV: 181%   DLCO: 25%    Interpretation   Spirometry   Spirometry shows severe obstruction.   Review of FVL curve   Effort is normal.   Lung Volume Measurements  Measurements show: normal results and elevated residual volume consistent with gas trapping.   Diffusion Capacity  The patient's diffusion capacity is severely reduced.

## 2020-10-01 ENCOUNTER — APPOINTMENT (OUTPATIENT)
Dept: CT IMAGING | Facility: HOSPITAL | Age: 60
End: 2020-10-01

## 2020-10-01 ENCOUNTER — HOSPITAL ENCOUNTER (EMERGENCY)
Facility: HOSPITAL | Age: 60
Discharge: HOME | End: 2020-10-01
Attending: FAMILY MEDICINE | Admitting: FAMILY MEDICINE

## 2020-10-01 ENCOUNTER — APPOINTMENT (OUTPATIENT)
Dept: GENERAL RADIOLOGY | Facility: HOSPITAL | Age: 60
End: 2020-10-01

## 2020-10-01 VITALS
DIASTOLIC BLOOD PRESSURE: 50 MMHG | HEIGHT: 62 IN | BODY MASS INDEX: 18.95 KG/M2 | HEART RATE: 66 BPM | OXYGEN SATURATION: 97 % | SYSTOLIC BLOOD PRESSURE: 98 MMHG | TEMPERATURE: 100.4 F | WEIGHT: 103 LBS | RESPIRATION RATE: 24 BRPM

## 2020-10-01 DIAGNOSIS — N23 RENAL COLIC ON RIGHT SIDE: Primary | ICD-10-CM

## 2020-10-01 DIAGNOSIS — J18.9 PNEUMONIA OF LEFT LUNG DUE TO INFECTIOUS ORGANISM, UNSPECIFIED PART OF LUNG: ICD-10-CM

## 2020-10-01 DIAGNOSIS — R50.9 FEVER, UNSPECIFIED FEVER CAUSE: ICD-10-CM

## 2020-10-01 LAB
ALBUMIN SERPL-MCNC: 3.9 G/DL (ref 3.5–5.2)
ALBUMIN/GLOB SERPL: 1.3 G/DL
ALP SERPL-CCNC: 80 U/L (ref 39–117)
ALT SERPL W P-5'-P-CCNC: 8 U/L (ref 1–33)
ANION GAP SERPL CALCULATED.3IONS-SCNC: 8 MMOL/L (ref 5–15)
AST SERPL-CCNC: 16 U/L (ref 1–32)
BASOPHILS # BLD AUTO: 0.07 10*3/MM3 (ref 0–0.2)
BASOPHILS NFR BLD AUTO: 0.3 % (ref 0–1.5)
BILIRUB SERPL-MCNC: 0.4 MG/DL (ref 0–1.2)
BILIRUB UR QL STRIP: ABNORMAL
BUN SERPL-MCNC: 19 MG/DL (ref 8–23)
BUN/CREAT SERPL: 19.8 (ref 7–25)
CALCIUM SPEC-SCNC: 9.4 MG/DL (ref 8.6–10.5)
CHLORIDE SERPL-SCNC: 100 MMOL/L (ref 98–107)
CK SERPL-CCNC: 40 U/L (ref 20–180)
CLARITY UR: CLEAR
CO2 SERPL-SCNC: 27 MMOL/L (ref 22–29)
COLOR UR: ABNORMAL
CREAT SERPL-MCNC: 0.96 MG/DL (ref 0.57–1)
DEPRECATED RDW RBC AUTO: 48 FL (ref 37–54)
EOSINOPHIL # BLD AUTO: 0.02 10*3/MM3 (ref 0–0.4)
EOSINOPHIL NFR BLD AUTO: 0.1 % (ref 0.3–6.2)
ERYTHROCYTE [DISTWIDTH] IN BLOOD BY AUTOMATED COUNT: 14.1 % (ref 12.3–15.4)
GFR SERPL CREATININE-BSD FRML MDRD: 59 ML/MIN/1.73
GLOBULIN UR ELPH-MCNC: 2.9 GM/DL
GLUCOSE SERPL-MCNC: 123 MG/DL (ref 65–99)
GLUCOSE UR STRIP-MCNC: NEGATIVE MG/DL
HCT VFR BLD AUTO: 38.5 % (ref 34–46.6)
HGB BLD-MCNC: 13 G/DL (ref 12–15.9)
HGB UR QL STRIP.AUTO: NEGATIVE
HOLD SPECIMEN: NORMAL
IMM GRANULOCYTES # BLD AUTO: 0.14 10*3/MM3 (ref 0–0.05)
IMM GRANULOCYTES NFR BLD AUTO: 0.6 % (ref 0–0.5)
KETONES UR QL STRIP: ABNORMAL
LEUKOCYTE ESTERASE UR QL STRIP.AUTO: NEGATIVE
LIPASE SERPL-CCNC: 34 U/L (ref 13–60)
LYMPHOCYTES # BLD AUTO: 1.61 10*3/MM3 (ref 0.7–3.1)
LYMPHOCYTES NFR BLD AUTO: 7.2 % (ref 19.6–45.3)
MCH RBC QN AUTO: 31.4 PG (ref 26.6–33)
MCHC RBC AUTO-ENTMCNC: 33.8 G/DL (ref 31.5–35.7)
MCV RBC AUTO: 93 FL (ref 79–97)
MONOCYTES # BLD AUTO: 2.26 10*3/MM3 (ref 0.1–0.9)
MONOCYTES NFR BLD AUTO: 10.1 % (ref 5–12)
NEUTROPHILS NFR BLD AUTO: 18.24 10*3/MM3 (ref 1.7–7)
NEUTROPHILS NFR BLD AUTO: 81.7 % (ref 42.7–76)
NITRITE UR QL STRIP: NEGATIVE
NRBC BLD AUTO-RTO: 0 /100 WBC (ref 0–0.2)
NT-PROBNP SERPL-MCNC: 245.4 PG/ML (ref 0–900)
PH UR STRIP.AUTO: 6 [PH] (ref 5–9)
PLATELET # BLD AUTO: 387 10*3/MM3 (ref 140–450)
PMV BLD AUTO: 9.6 FL (ref 6–12)
POTASSIUM SERPL-SCNC: 4.4 MMOL/L (ref 3.5–5.2)
PROT SERPL-MCNC: 6.8 G/DL (ref 6–8.5)
PROT UR QL STRIP: NEGATIVE
RBC # BLD AUTO: 4.14 10*6/MM3 (ref 3.77–5.28)
SODIUM SERPL-SCNC: 135 MMOL/L (ref 136–145)
SP GR UR STRIP: 1.02 (ref 1–1.03)
TROPONIN T SERPL-MCNC: <0.01 NG/ML (ref 0–0.03)
UROBILINOGEN UR QL STRIP: ABNORMAL
WBC # BLD AUTO: 22.34 10*3/MM3 (ref 3.4–10.8)
WHOLE BLOOD HOLD SPECIMEN: NORMAL

## 2020-10-01 PROCEDURE — 96374 THER/PROPH/DIAG INJ IV PUSH: CPT

## 2020-10-01 PROCEDURE — 81003 URINALYSIS AUTO W/O SCOPE: CPT | Performed by: FAMILY MEDICINE

## 2020-10-01 PROCEDURE — C9803 HOPD COVID-19 SPEC COLLECT: HCPCS | Performed by: FAMILY MEDICINE

## 2020-10-01 PROCEDURE — 25010000002 MORPHINE PER 10 MG: Performed by: FAMILY MEDICINE

## 2020-10-01 PROCEDURE — 82550 ASSAY OF CK (CPK): CPT | Performed by: FAMILY MEDICINE

## 2020-10-01 PROCEDURE — 99284 EMERGENCY DEPT VISIT MOD MDM: CPT

## 2020-10-01 PROCEDURE — 74176 CT ABD & PELVIS W/O CONTRAST: CPT

## 2020-10-01 PROCEDURE — 83690 ASSAY OF LIPASE: CPT | Performed by: FAMILY MEDICINE

## 2020-10-01 PROCEDURE — 71045 X-RAY EXAM CHEST 1 VIEW: CPT

## 2020-10-01 PROCEDURE — 87635 SARS-COV-2 COVID-19 AMP PRB: CPT | Performed by: FAMILY MEDICINE

## 2020-10-01 PROCEDURE — 93010 ELECTROCARDIOGRAM REPORT: CPT | Performed by: INTERNAL MEDICINE

## 2020-10-01 PROCEDURE — 83880 ASSAY OF NATRIURETIC PEPTIDE: CPT | Performed by: FAMILY MEDICINE

## 2020-10-01 PROCEDURE — 25010000002 KETOROLAC TROMETHAMINE PER 15 MG: Performed by: FAMILY MEDICINE

## 2020-10-01 PROCEDURE — 25010000002 ONDANSETRON PER 1 MG: Performed by: FAMILY MEDICINE

## 2020-10-01 PROCEDURE — 85025 COMPLETE CBC W/AUTO DIFF WBC: CPT | Performed by: FAMILY MEDICINE

## 2020-10-01 PROCEDURE — 96375 TX/PRO/DX INJ NEW DRUG ADDON: CPT

## 2020-10-01 PROCEDURE — 80053 COMPREHEN METABOLIC PANEL: CPT | Performed by: FAMILY MEDICINE

## 2020-10-01 PROCEDURE — 84484 ASSAY OF TROPONIN QUANT: CPT | Performed by: FAMILY MEDICINE

## 2020-10-01 PROCEDURE — 93005 ELECTROCARDIOGRAM TRACING: CPT | Performed by: FAMILY MEDICINE

## 2020-10-01 RX ORDER — ONDANSETRON 4 MG/1
4 TABLET, ORALLY DISINTEGRATING ORAL EVERY 6 HOURS PRN
Qty: 10 TABLET | Refills: 0 | Status: SHIPPED | OUTPATIENT
Start: 2020-10-01

## 2020-10-01 RX ORDER — KETOROLAC TROMETHAMINE 15 MG/ML
15 INJECTION, SOLUTION INTRAMUSCULAR; INTRAVENOUS ONCE
Status: COMPLETED | OUTPATIENT
Start: 2020-10-01 | End: 2020-10-01

## 2020-10-01 RX ORDER — LEVOFLOXACIN 500 MG/1
500 TABLET, FILM COATED ORAL EVERY 24 HOURS
Status: COMPLETED | OUTPATIENT
Start: 2020-10-01 | End: 2020-10-01

## 2020-10-01 RX ORDER — LEVOFLOXACIN 500 MG/1
500 TABLET, FILM COATED ORAL DAILY
Qty: 10 TABLET | Refills: 0 | Status: SHIPPED | OUTPATIENT
Start: 2020-10-01 | End: 2020-10-08

## 2020-10-01 RX ORDER — ONDANSETRON 2 MG/ML
4 INJECTION INTRAMUSCULAR; INTRAVENOUS ONCE
Status: COMPLETED | OUTPATIENT
Start: 2020-10-01 | End: 2020-10-01

## 2020-10-01 RX ORDER — ACETAMINOPHEN 325 MG/1
650 TABLET ORAL ONCE
Status: COMPLETED | OUTPATIENT
Start: 2020-10-01 | End: 2020-10-01

## 2020-10-01 RX ORDER — HYDROCODONE BITARTRATE AND ACETAMINOPHEN 5; 325 MG/1; MG/1
1 TABLET ORAL EVERY 6 HOURS PRN
Qty: 12 TABLET | Refills: 0 | Status: SHIPPED | OUTPATIENT
Start: 2020-10-01

## 2020-10-01 RX ADMIN — ONDANSETRON HYDROCHLORIDE 4 MG: 2 INJECTION, SOLUTION INTRAMUSCULAR; INTRAVENOUS at 16:27

## 2020-10-01 RX ADMIN — MORPHINE SULFATE 4 MG: 4 INJECTION INTRAVENOUS at 16:28

## 2020-10-01 RX ADMIN — ACETAMINOPHEN 650 MG: 325 TABLET, FILM COATED ORAL at 16:27

## 2020-10-01 RX ADMIN — KETOROLAC TROMETHAMINE 15 MG: 15 INJECTION, SOLUTION INTRAMUSCULAR; INTRAVENOUS at 16:29

## 2020-10-01 RX ADMIN — LEVOFLOXACIN 500 MG: 500 TABLET, FILM COATED ORAL at 18:43

## 2020-10-01 NOTE — ED PROVIDER NOTES
Subjective   Pt states htat shehad a elft ureteral stent placed and then removed 1 week ago on left. Pt now c/o right flank pain x 3 days. Pt smokes and c/o chronic SOA.       Shortness of Breath  Severity:  Mild  Onset quality:  Gradual  Timing:  Constant  Chronicity:  Chronic  Worsened by:  Exertion  Associated symptoms: abdominal pain    Associated symptoms: no chest pain, no cough, no diaphoresis, no ear pain, no fever, no headaches, no neck pain, no rash, no sore throat, no vomiting and no wheezing    Abdominal pain:     Location:  R flank    Quality: aching      Severity:  Moderate    Onset quality:  Unable to specify    Duration:  3 days    Timing:  Constant    Progression:  Worsening    Chronicity:  Recurrent  Abdominal Pain  Associated symptoms: shortness of breath    Associated symptoms: no chest pain, no chills, no cough, no diarrhea, no dysuria, no fatigue, no fever, no nausea, no sore throat and no vomiting        Review of Systems   Constitutional: Negative for appetite change, chills, diaphoresis, fatigue and fever.   HENT: Negative for congestion, ear discharge, ear pain, nosebleeds, rhinorrhea, sinus pressure, sore throat and trouble swallowing.    Eyes: Negative for discharge and redness.   Respiratory: Positive for shortness of breath. Negative for apnea, cough, chest tightness and wheezing.    Cardiovascular: Negative for chest pain.   Gastrointestinal: Positive for abdominal pain. Negative for diarrhea, nausea and vomiting.   Endocrine: Negative for polyuria.   Genitourinary: Positive for difficulty urinating. Negative for dysuria, frequency and urgency.   Musculoskeletal: Negative for myalgias and neck pain.   Skin: Negative for color change and rash.   Allergic/Immunologic: Negative for immunocompromised state.   Neurological: Negative for dizziness, seizures, syncope, weakness, light-headedness and headaches.   Hematological: Negative for adenopathy. Does not bruise/bleed easily.  "  Psychiatric/Behavioral: Negative for behavioral problems and confusion.   All other systems reviewed and are negative.      Past Medical History:   Diagnosis Date   • COPD (chronic obstructive pulmonary disease) (CMS/HCC)    • Emphysema of lung (CMS/HCC)    • Emphysema of lung (CMS/HCC)    • Hypertension    • SOB (shortness of breath)    • Tobacco use    • Vaginal bleeding        Allergies   Allergen Reactions   • Amitriptyline Palpitations     Pt reports feeling \"like heart is beating out of chest\"   • Nortriptyline GI Intolerance   • Tricyclic Antidepressants Palpitations     Heart racing   • Codeine Nausea Only   • Tylenol [Acetaminophen] Nausea Only       Past Surgical History:   Procedure Laterality Date   • CYSTOSCOPY, RETROGRADE PYELOGRAM AND STENT INSERTION Right 7/18/2018    Procedure: CYSTOSCOPY RETROGRADE PYELOGRAM AND STENT INSERTION;  Surgeon: Jorge Negro MD;  Location: [facility];  Service: Urology   • HYSTERECTOMY      Ovary Preserv, age 16   • NECK SURGERY     • US GUIDED FINE NEEDLE ASPIRATION  6/7/2018       Family History   Family history unknown: Yes       Social History     Socioeconomic History   • Marital status: Single     Spouse name: Not on file   • Number of children: Not on file   • Years of education: Not on file   • Highest education level: Not on file   Tobacco Use   • Smoking status: Current Every Day Smoker     Packs/day: 0.25     Years: 48.00     Pack years: 12.00     Types: Cigarettes   • Smokeless tobacco: Never Used   Substance and Sexual Activity   • Alcohol use: No   • Drug use: No   • Sexual activity: Defer           Objective   Physical Exam  Vitals signs and nursing note reviewed.   Constitutional:       Appearance: She is well-developed.   HENT:      Head: Normocephalic and atraumatic.      Nose: Nose normal.   Eyes:      General: No scleral icterus.        Right eye: No discharge.         Left eye: No discharge.      Conjunctiva/sclera: Conjunctivae normal.      " Pupils: Pupils are equal, round, and reactive to light.   Neck:      Musculoskeletal: Normal range of motion and neck supple.      Trachea: No tracheal deviation.   Cardiovascular:      Rate and Rhythm: Normal rate and regular rhythm.      Heart sounds: Normal heart sounds. No murmur.   Pulmonary:      Effort: Pulmonary effort is normal. No respiratory distress.      Breath sounds: Normal breath sounds. No stridor. No wheezing or rales.   Abdominal:      General: Bowel sounds are normal. There is no distension.      Palpations: Abdomen is soft. There is no mass.      Tenderness: There is abdominal tenderness in the right lower quadrant. There is right CVA tenderness. There is no guarding or rebound.   Skin:     General: Skin is warm and dry.      Findings: No erythema or rash.   Neurological:      Mental Status: She is alert and oriented to person, place, and time.      Coordination: Coordination normal.   Psychiatric:         Behavior: Behavior normal.         Thought Content: Thought content normal.         ECG 12 Lead      Date/Time: 10/1/2020 6:30 PM  Performed by: Jacky Garcia MD  Authorized by: Jacky Garcia MD   Interpreted by physician  Rhythm: sinus rhythm  Rate: normal  BPM: 94  ST Segments: ST segments normal                   ED Course  ED Course as of Oct 01 1837   Thu Oct 01, 2020   1836 Findings were discussed in detail with patient and patient's daughter.  A discussion took place regarding the patient's disposition and patient would currently prefer to go home and try outpatient antibiotics for fever and early signs of pneumonia.  He was advised to follow-up with her primary care provider in the next 3 days or to return to the emergency department should her symptoms worsen.    [CB]      ED Course User Index  [CB] Jacky Garcia MD             Labs Reviewed   COMPREHENSIVE METABOLIC PANEL - Abnormal; Notable for the following components:       Result Value    Glucose 123 (*)      Sodium 135 (*)     eGFR Non  Amer 59 (*)     All other components within normal limits    Narrative:     GFR Normal >60  Chronic Kidney Disease <60  Kidney Failure <15     URINALYSIS W/ CULTURE IF INDICATED - Abnormal; Notable for the following components:    Ketones, UA Trace (*)     Bilirubin, UA Small (1+) (*)     Urobilinogen, UA 2.0 E.U./dL (*)     All other components within normal limits    Narrative:     Urine microscopic not indicated.   CBC WITH AUTO DIFFERENTIAL - Abnormal; Notable for the following components:    WBC 22.34 (*)     Neutrophil % 81.7 (*)     Lymphocyte % 7.2 (*)     Eosinophil % 0.1 (*)     Immature Grans % 0.6 (*)     Neutrophils, Absolute 18.24 (*)     Monocytes, Absolute 2.26 (*)     Immature Grans, Absolute 0.14 (*)     All other components within normal limits   LIPASE - Normal   BNP (IN-HOUSE) - Normal    Narrative:     Among patients with dyspnea, NT-proBNP is highly sensitive for the detection of acute congestive heart failure. In addition NT-proBNP of <300 pg/ml effectively rules out acute congestive heart failure with 99% negative predictive value.    Results may be falsely decreased if patient taking Biotin.     CK - Normal   TROPONIN (IN-HOUSE) - Normal    Narrative:     Troponin T Reference Range:  <= 0.03 ng/mL-   Negative for AMI  >0.03 ng/mL-     Abnormal for myocardial necrosis.  Clinicians would have to utilize clinical acumen, EKG, Troponin and serial changes to determine if it is an Acute Myocardial Infarction or myocardial injury due to an underlying chronic condition.       Results may be falsely decreased if patient taking Biotin.     COVID PRE-OP / PRE-PROCEDURE SCREENING ORDER (NO ISOLATION)    Narrative:     The following orders were created for panel order COVID PRE-OP / PRE-PROCEDURE SCREENING ORDER (NO ISOLATION) - Swab, Nasopharynx.  Procedure                               Abnormality         Status                     ---------                                -----------         ------                     COVID-19, ANNE SHORT IN-HOUS...[[id number]]                                                   Please view results for these tests on the individual orders.   COVID-19ANNE IN-HOUSE, NP SWAB IN TRANSPORT MEDIA 8-10 HR TAT   CBC AND DIFFERENTIAL    Narrative:     The following orders were created for panel order CBC & Differential.  Procedure                               Abnormality         Status                     ---------                               -----------         ------                     CBC Auto Differential[[id number]]        Abnormal            Final result                 Please view results for these tests on the individual orders.   EXTRA TUBES    Narrative:     The following orders were created for panel order Extra Tubes.  Procedure                               Abnormality         Status                     ---------                               -----------         ------                     Light Blue Top[[id number]]                                   Final result               Gold Top - SST[[id number]]                                   Final result                 Please view results for these tests on the individual orders.   LIGHT BLUE TOP   GOLD TOP - SST       CT Abdomen Pelvis Without Contrast   Final Result   Conclusion:   Moderate right hydronephrosis without hydroureter.   Findings may represent previously questioned UPJ obstruction or   nonopaque calculus at the right ureteropelvic junction.   Nonobstructive left renal calculi.   Cholelithiasis versus sludge.   Additional findings as above.      [id number]      Electronically signed by:  Yahir Lynn MD  10/1/2020 5:39 PM CDT   Workstation: [id number]      XR Chest 1 View   Final Result   CONCLUSION:   Chronic obstructive pulmonary disease.   Question minimal subsegmental infiltrate left midlung.      [id number]      Electronically signed by:  Yahir Lynn MD  10/1/2020 4:35 PM CDT    Workstation: [id number]                                          [facility]    Final diagnoses:   Fever, unspecified fever cause   Renal colic on right side   Pneumonia of left lung due to infectious organism, unspecified part of lung            Jacky Garcia MD  10/01/20 1831       Jacky Garcia MD  10/01/20 1839

## 2020-10-02 ENCOUNTER — TELEPHONE (OUTPATIENT)
Dept: OTHER | Facility: HOSPITAL | Age: 60
End: 2020-10-02

## 2020-10-02 LAB — SARS-COV-2 N GENE RESP QL NAA+PROBE: NOT DETECTED

## (undated) DEVICE — GLV SURG SENSICARE GREEN W/ALOE PF LF 6.5 STRL

## (undated) DEVICE — CATH URETRL OPN/END 5F70CM

## (undated) DEVICE — GW PTFE FIX/CORE FLXTIP .038 3X150CM

## (undated) DEVICE — SOL IRRG H2O PL/BG 1000ML STRL

## (undated) DEVICE — GLV SURG NEOLON 2G PF LF 7.5 STRL

## (undated) DEVICE — PK CYSTO LF 60

## (undated) DEVICE — GLV SURG NEOLON 2G PF LF 6.5 STRL

## (undated) DEVICE — SOL IRR H2O BTL 1000ML STRL